# Patient Record
Sex: FEMALE | Race: WHITE | URBAN - METROPOLITAN AREA
[De-identification: names, ages, dates, MRNs, and addresses within clinical notes are randomized per-mention and may not be internally consistent; named-entity substitution may affect disease eponyms.]

---

## 2017-02-23 ENCOUNTER — TRANSFERRED RECORDS (OUTPATIENT)
Dept: HEALTH INFORMATION MANAGEMENT | Facility: CLINIC | Age: 37
End: 2017-02-23

## 2017-10-22 ENCOUNTER — HEALTH MAINTENANCE LETTER (OUTPATIENT)
Age: 37
End: 2017-10-22

## 2017-12-04 ENCOUNTER — TRANSFERRED RECORDS (OUTPATIENT)
Dept: HEALTH INFORMATION MANAGEMENT | Facility: CLINIC | Age: 37
End: 2017-12-04

## 2018-02-16 ENCOUNTER — OFFICE VISIT (OUTPATIENT)
Dept: OPHTHALMOLOGY | Facility: CLINIC | Age: 38
End: 2018-02-16
Attending: OPHTHALMOLOGY
Payer: COMMERCIAL

## 2018-02-16 DIAGNOSIS — H18.549 LATTICE CORNEAL DYSTROPHY: Primary | ICD-10-CM

## 2018-02-16 DIAGNOSIS — Z94.7 CORNEA REPLACED BY TRANSPLANT: ICD-10-CM

## 2018-02-16 PROCEDURE — G0463 HOSPITAL OUTPT CLINIC VISIT: HCPCS | Mod: ZF

## 2018-02-16 PROCEDURE — 92025 CPTRIZED CORNEAL TOPOGRAPHY: CPT | Mod: ZF | Performed by: OPHTHALMOLOGY

## 2018-02-16 ASSESSMENT — EXTERNAL EXAM - RIGHT EYE: OD_EXAM: NORMAL

## 2018-02-16 ASSESSMENT — SLIT LAMP EXAM - LIDS
COMMENTS: NORMAL
COMMENTS: NORMAL

## 2018-02-16 ASSESSMENT — EXTERNAL EXAM - LEFT EYE: OS_EXAM: NORMAL

## 2018-02-16 ASSESSMENT — CONF VISUAL FIELD
OS_NORMAL: 1
METHOD: COUNTING FINGERS
OD_NORMAL: 1

## 2018-02-16 ASSESSMENT — VISUAL ACUITY
OS_SC: 20/20
METHOD: SNELLEN - LINEAR
OD_SC+: -1
OD_SC: 20/80
CORRECTION_TYPE: CONTACTS
OD_PH_SC: 20/40-1
OS_SC+: -1

## 2018-02-16 ASSESSMENT — TONOMETRY
OS_IOP_MMHG: 22
OD_IOP_MMHG: 27
IOP_METHOD: ICARE

## 2018-02-16 ASSESSMENT — CUP TO DISC RATIO
OS_RATIO: 0.3
OD_RATIO: 0.3?

## 2018-02-16 NOTE — PROGRESS NOTES
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 Farris - 2010    H/o Herpes simples virus keratitis left eye    S/p punctal occlusion to all four tear ducts    Eye Meds:  - lotemax twice a day left eye     IMAGING & TESTING:    None    ASSESSMENT & PLAN:    1. History of penetrating keratoplasty (PK), left eye: for lattice corneal dystrophy, graft is doing well, extensive ocular surface dryness   - 4 punctal plugs in place - has had to replace several times (most recently Nov / Dec)   - recommend artificial tears 4x per day both eyes   - following with general ophthalmologist in [place], [place]    2. Lattice corneal dystrophy, both eyes: vision in right eye further decreased   - ready for transplant   - proceed with deep anterior lamellar keratoplasty (DALK) vs penetrating keratoplasty (PK) right eye     3. Elevated intraocular pressure - may be artifactual w corneal changes   - continue to monitor w Dr. Renner   - nerves look stable by exam   - reasonable to do OCT nerve fiber layer this year for tracking    R/B/A of surgery discussed  120 minutes for deep anterior lamellar keratoplasty (DALK)  General anesthesia        ~~~~~~~~~~~~~~~~~~~~~~~~~~~~~~~~~~~~~~~~~~~~~~~~~~~~~~~~~~~~~~~~    Complete documentation of historical and exam elements from today's encounter can be found in the full encounter summary report (not reduplicated in this progress note). I personally obtained the chief complaint(s) and history of present illness.  I confirmed and edited as necessary the review of systems, past medical/surgical history,  family history, social history, and examination findings as documented by others.  I examined the patient myself, and I personally reviewed the relevant tests, images, and reports as documented above. I formulated and edited as necessary the assessment and plan and discussed the findings and management plan with the patient and family.     I personally interpreted the diagnostic / imaging study and have edited the interpretation as needed.    Robbie Garcia MD, MA  Director, Cornea & Anterior Segment  [facility] Department of Ophthalmology & Visual Neuroscience

## 2018-02-16 NOTE — NURSING NOTE
Chief Complaints and History of Present Illnesses   Patient presents with     Follow Up For     Lattice corneal dystrophy - Both Eyes      HPI    Last Eye Exam:  3/22/16   Affected eye(s):  Both   Symptoms:        Unknown duration    Frequency:  Constant       Do you have eye pain now?:  No      Comments:  Leeanne is here today for a follow up of Lattice corneal dystrophy - Both Eyes   She states no change in vision since last visit. Since last visit she says she was diagnosed with the herpes virus LE. When she gets a flare up her vision becomes blurry LESanket Nettles COT 8:43 AM February 16, 2018

## 2018-02-16 NOTE — MR AVS SNAPSHOT
After Visit Summary   2/16/2018    Leeanne Butcher    MRN: [id number]           Patient Information     Date Of Birth          1980        Visit Information        Provider Department      2/16/2018 8:30 AM Robbie Garcia MD [facility]        Today's Diagnoses     Lattice corneal dystrophy - Both Eyes    -  1    Cornea replaced by transplant - Left Eye           Follow-ups after your visit        Your next 10 appointments already scheduled     Apr 24, 2018  8:15 AM CDT   Post-Op with Robbie Garcia MD   Eye Clinic ([facility])    [address] [address]   [phone]            May 01, 2018  8:30 AM CDT   Post-Op with Robbie Garcia MD   Eye Clinic ([facility])    [address] [address] [phone]            May 25, 2018  7:30 AM CDT   Post-Op with Robbie Garcia MD   Eye Clinic ([facility])    [address] [address]   [phone]              Who to contact     Please call your clinic at [phone] to:    Ask questions about your health    Make or cancel appointments    Discuss your medicines    Learn about your test results    Speak to your doctor            Additional Information About Your Visit        [employer] Information     [employer] gives you secure access to your electronic health record. If you see a primary care provider, you can also send messages to your care team and make appointments. If you have questions, please call your primary care clinic.  If you do not have a primary care provider, please call [phone] and they will assist you.      [employer] is an electronic gateway that provides easy, online access to your medical records. With [employer], you can request a clinic appointment, read your test results, renew a prescription or  communicate with your care team.     To access your existing account, please contact your [facility] or call [phone] for assistance.        Care EveryWhere ID     This is your Care EveryWhere ID. This could be used by other organizations to access your [place] medical records  [id number]         Blood Pressure from Last 3 Encounters:   No data found for BP    Weight from Last 3 Encounters:   No data found for Wt              We Performed the Following     Corneal Topography OU (both eyes)     Karen-Operative Worksheet        Primary Care Provider Fax #    Physician No Ref-Primary [phone]       No address on file        Equal Access to Services     [facility]: Hadii aad ku hadasho Soomaali, waaxda luqadaha, qaybta kaalmada adeshannayajaxson, [place] hernandez . So [facility] [phone].    ATENCIÓN: Si habla español, tiene a ferro disposición servicios gratuitos de asistencia lingüística. [facility] [phone].    We comply with applicable federal civil rights laws and [place] laws. We do not discriminate on the basis of race, color, national origin, age, disability, sex, sexual orientation, or gender identity.            Thank you!     Thank you for choosing EYE CLINIC  for your care. Our goal is always to provide you with excellent care. Hearing back from our patients is one way we can continue to improve our services. Please take a few minutes to complete the written survey that you may receive in the mail after your visit with us. Thank you!             Your Updated Medication List - Protect others around you: Learn how to safely use, store and throw away your medicines at www.disposemymeds.org.          This list is accurate as of 2/16/18 10:43 AM.  Always use your most recent med list.                   Brand Name Dispense Instructions for use Diagnosis    LOTEMAX 0.5 % ophthalmic susp   Generic drug:  loteprednol      Place 1 drop Into the left eye 2  times daily        ORTHO TRI-CYCLEN (28) PO      Take 1 tablet by mouth daily        UNABLE TO FIND      MEDICATION NAME:  Colestipol 1 GM, 2 tablets daily at bedtime.        VALTREX PO      Take 1 capsule by mouth daily

## 2018-04-20 RX ORDER — MONTELUKAST SODIUM 4 MG/1
1 TABLET, CHEWABLE ORAL 2 TIMES DAILY
COMMUNITY

## 2018-04-23 ENCOUNTER — SURGERY (OUTPATIENT)
Age: 38
End: 2018-04-23

## 2018-04-23 ENCOUNTER — HOSPITAL ENCOUNTER (OUTPATIENT)
Facility: CLINIC | Age: 38
Discharge: HOME | End: 2018-04-23
Attending: OPHTHALMOLOGY | Admitting: OPHTHALMOLOGY
Payer: COMMERCIAL

## 2018-04-23 ENCOUNTER — ANESTHESIA EVENT (OUTPATIENT)
Dept: SURGERY | Facility: CLINIC | Age: 38
End: 2018-04-23
Payer: COMMERCIAL

## 2018-04-23 ENCOUNTER — ANESTHESIA (OUTPATIENT)
Dept: SURGERY | Facility: CLINIC | Age: 38
End: 2018-04-23
Payer: COMMERCIAL

## 2018-04-23 VITALS
BODY MASS INDEX: 28.64 KG/M2 | HEIGHT: 68 IN | SYSTOLIC BLOOD PRESSURE: 101 MMHG | DIASTOLIC BLOOD PRESSURE: 68 MMHG | RESPIRATION RATE: 14 BRPM | OXYGEN SATURATION: 95 % | WEIGHT: 189 LBS | TEMPERATURE: 96.5 F

## 2018-04-23 DIAGNOSIS — H18.549 LATTICE CORNEAL DYSTROPHY: Primary | ICD-10-CM

## 2018-04-23 LAB
BACTERIA SPEC CULT: NORMAL
BACTERIA SPEC CULT: NORMAL
HCG UR QL: NEGATIVE
SPECIMEN SOURCE: NORMAL

## 2018-04-23 PROCEDURE — 71000028 ZZH EYE RECOVERY PHASE 2 EACH 15 MINS: Performed by: OPHTHALMOLOGY

## 2018-04-23 PROCEDURE — 25000125 ZZHC RX 250: Performed by: OPHTHALMOLOGY

## 2018-04-23 PROCEDURE — 25000128 H RX IP 250 OP 636: Performed by: NURSE ANESTHETIST, CERTIFIED REGISTERED

## 2018-04-23 PROCEDURE — 25000128 H RX IP 250 OP 636: Performed by: OPHTHALMOLOGY

## 2018-04-23 PROCEDURE — 25000128 H RX IP 250 OP 636: Performed by: ANESTHESIOLOGY

## 2018-04-23 PROCEDURE — 25000125 ZZHC RX 250

## 2018-04-23 PROCEDURE — 87075 CULTR BACTERIA EXCEPT BLOOD: CPT | Performed by: OPHTHALMOLOGY

## 2018-04-23 PROCEDURE — 36000104 ZZH EYE SURGERY LEVEL 4 1ST 30 MIN: Performed by: OPHTHALMOLOGY

## 2018-04-23 PROCEDURE — 37000009 ZZH ANESTHESIA TECHNICAL FEE, EACH ADDTL 15 MIN: Performed by: OPHTHALMOLOGY

## 2018-04-23 PROCEDURE — 71000005 ZZH RECOVERY EYE PHASE 1 LEVEL 1 EA ADDTL HR: Performed by: OPHTHALMOLOGY

## 2018-04-23 PROCEDURE — 81025 URINE PREGNANCY TEST: CPT | Performed by: ANESTHESIOLOGY

## 2018-04-23 PROCEDURE — 25000125 ZZHC RX 250: Performed by: NURSE ANESTHETIST, CERTIFIED REGISTERED

## 2018-04-23 PROCEDURE — 87070 CULTURE OTHR SPECIMN AEROBIC: CPT | Performed by: OPHTHALMOLOGY

## 2018-04-23 PROCEDURE — 71000004 ZZH RECOVERY EYE PHASE 1 LEVEL 1 FIRST HR: Performed by: OPHTHALMOLOGY

## 2018-04-23 PROCEDURE — 36000105 ZZH EYE SURGERY LEVEL 4 EA 15 ADDTL MIN: Performed by: OPHTHALMOLOGY

## 2018-04-23 PROCEDURE — 40000170 ZZH STATISTIC PRE-PROCEDURE ASSESSMENT II: Performed by: OPHTHALMOLOGY

## 2018-04-23 PROCEDURE — V2785 CORNEAL TISSUE PROCESSING: HCPCS | Performed by: OPHTHALMOLOGY

## 2018-04-23 PROCEDURE — 25000125 ZZHC RX 250: Performed by: ANESTHESIOLOGY

## 2018-04-23 PROCEDURE — 37000008 ZZH ANESTHESIA TECHNICAL FEE, 1ST 30 MIN: Performed by: OPHTHALMOLOGY

## 2018-04-23 PROCEDURE — 25000566 ZZH SEVOFLURANE, EA 15 MIN: Performed by: OPHTHALMOLOGY

## 2018-04-23 PROCEDURE — 27210794 ZZH OR GENERAL SUPPLY STERILE: Performed by: OPHTHALMOLOGY

## 2018-04-23 PROCEDURE — 87102 FUNGUS ISOLATION CULTURE: CPT | Performed by: OPHTHALMOLOGY

## 2018-04-23 DEVICE — EYE CORNEA PROCESS FEE FOR MN LIONS BANK: Type: IMPLANTABLE DEVICE | Site: EYE | Status: FUNCTIONAL

## 2018-04-23 RX ORDER — MEPERIDINE HYDROCHLORIDE 25 MG/ML
12.5 INJECTION INTRAMUSCULAR; INTRAVENOUS; SUBCUTANEOUS
Status: DISCONTINUED | OUTPATIENT
Start: 2018-04-23 | End: 2018-04-23 | Stop reason: HOSPADM

## 2018-04-23 RX ORDER — FENTANYL CITRATE 50 UG/ML
25-50 INJECTION, SOLUTION INTRAMUSCULAR; INTRAVENOUS
Status: DISCONTINUED | OUTPATIENT
Start: 2018-04-23 | End: 2018-04-23 | Stop reason: HOSPADM

## 2018-04-23 RX ORDER — ONDANSETRON 4 MG/1
4 TABLET, ORALLY DISINTEGRATING ORAL EVERY 30 MIN PRN
Status: DISCONTINUED | OUTPATIENT
Start: 2018-04-23 | End: 2018-04-23 | Stop reason: HOSPADM

## 2018-04-23 RX ORDER — PILOCARPINE HYDROCHLORIDE 10 MG/ML
1 SOLUTION/ DROPS OPHTHALMIC
Status: COMPLETED | OUTPATIENT
Start: 2018-04-23 | End: 2018-04-23

## 2018-04-23 RX ORDER — SODIUM CHLORIDE, SODIUM LACTATE, POTASSIUM CHLORIDE, CALCIUM CHLORIDE 600; 310; 30; 20 MG/100ML; MG/100ML; MG/100ML; MG/100ML
INJECTION, SOLUTION INTRAVENOUS CONTINUOUS
Status: DISCONTINUED | OUTPATIENT
Start: 2018-04-23 | End: 2018-04-23 | Stop reason: HOSPADM

## 2018-04-23 RX ORDER — LIDOCAINE HYDROCHLORIDE 20 MG/ML
INJECTION, SOLUTION INFILTRATION; PERINEURAL PRN
Status: DISCONTINUED | OUTPATIENT
Start: 2018-04-23 | End: 2018-04-23

## 2018-04-23 RX ORDER — ONDANSETRON 2 MG/ML
INJECTION INTRAMUSCULAR; INTRAVENOUS PRN
Status: DISCONTINUED | OUTPATIENT
Start: 2018-04-23 | End: 2018-04-23

## 2018-04-23 RX ORDER — HYDROMORPHONE HYDROCHLORIDE 1 MG/ML
.3-.5 INJECTION, SOLUTION INTRAMUSCULAR; INTRAVENOUS; SUBCUTANEOUS EVERY 10 MIN PRN
Status: DISCONTINUED | OUTPATIENT
Start: 2018-04-23 | End: 2018-04-23 | Stop reason: HOSPADM

## 2018-04-23 RX ORDER — PROPOFOL 10 MG/ML
INJECTION, EMULSION INTRAVENOUS PRN
Status: DISCONTINUED | OUTPATIENT
Start: 2018-04-23 | End: 2018-04-23

## 2018-04-23 RX ORDER — BALANCED SALT SOLUTION 6.4; .75; .48; .3; 3.9; 1.7 MG/ML; MG/ML; MG/ML; MG/ML; MG/ML; MG/ML
SOLUTION OPHTHALMIC PRN
Status: DISCONTINUED | OUTPATIENT
Start: 2018-04-23 | End: 2018-04-23 | Stop reason: HOSPADM

## 2018-04-23 RX ORDER — PREDNISOLONE ACETATE 10 MG/ML
1 SUSPENSION/ DROPS OPHTHALMIC 4 TIMES DAILY
Qty: 1 BOTTLE | Refills: 0 | Status: SHIPPED | OUTPATIENT
Start: 2018-04-23 | End: 2018-05-03

## 2018-04-23 RX ORDER — DEXAMETHASONE SODIUM PHOSPHATE 4 MG/ML
INJECTION, SOLUTION INTRA-ARTICULAR; INTRALESIONAL; INTRAMUSCULAR; INTRAVENOUS; SOFT TISSUE PRN
Status: DISCONTINUED | OUTPATIENT
Start: 2018-04-23 | End: 2018-04-23

## 2018-04-23 RX ORDER — FENTANYL CITRATE 50 UG/ML
INJECTION, SOLUTION INTRAMUSCULAR; INTRAVENOUS PRN
Status: DISCONTINUED | OUTPATIENT
Start: 2018-04-23 | End: 2018-04-23

## 2018-04-23 RX ORDER — OFLOXACIN 3 MG/ML
1 SOLUTION/ DROPS OPHTHALMIC 4 TIMES DAILY
Qty: 1 BOTTLE | Refills: 0 | Status: SHIPPED | OUTPATIENT
Start: 2018-04-23

## 2018-04-23 RX ORDER — PROPOFOL 10 MG/ML
INJECTION, EMULSION INTRAVENOUS CONTINUOUS PRN
Status: DISCONTINUED | OUTPATIENT
Start: 2018-04-23 | End: 2018-04-23

## 2018-04-23 RX ORDER — NALOXONE HYDROCHLORIDE 0.4 MG/ML
.1-.4 INJECTION, SOLUTION INTRAMUSCULAR; INTRAVENOUS; SUBCUTANEOUS
Status: DISCONTINUED | OUTPATIENT
Start: 2018-04-23 | End: 2018-04-23 | Stop reason: HOSPADM

## 2018-04-23 RX ORDER — ONDANSETRON 2 MG/ML
4 INJECTION INTRAMUSCULAR; INTRAVENOUS EVERY 30 MIN PRN
Status: DISCONTINUED | OUTPATIENT
Start: 2018-04-23 | End: 2018-04-23 | Stop reason: HOSPADM

## 2018-04-23 RX ORDER — ERYTHROMYCIN 5 MG/G
OINTMENT OPHTHALMIC PRN
Status: DISCONTINUED | OUTPATIENT
Start: 2018-04-23 | End: 2018-04-23 | Stop reason: HOSPADM

## 2018-04-23 RX ADMIN — SODIUM CHLORIDE, POTASSIUM CHLORIDE, SODIUM LACTATE AND CALCIUM CHLORIDE: 600; 310; 30; 20 INJECTION, SOLUTION INTRAVENOUS at 11:13

## 2018-04-23 RX ADMIN — PROPOFOL 75 MCG/KG/MIN: 10 INJECTION, EMULSION INTRAVENOUS at 11:53

## 2018-04-23 RX ADMIN — PHENYLEPHRINE HYDROCHLORIDE 100 MCG: 10 INJECTION, SOLUTION INTRAMUSCULAR; INTRAVENOUS; SUBCUTANEOUS at 12:58

## 2018-04-23 RX ADMIN — PHENYLEPHRINE HYDROCHLORIDE 50 MCG: 10 INJECTION, SOLUTION INTRAMUSCULAR; INTRAVENOUS; SUBCUTANEOUS at 12:19

## 2018-04-23 RX ADMIN — PROPOFOL 40 MG: 10 INJECTION, EMULSION INTRAVENOUS at 13:10

## 2018-04-23 RX ADMIN — DEXAMETHASONE SODIUM PHOSPHATE 4 MG: 4 INJECTION, SOLUTION INTRA-ARTICULAR; INTRALESIONAL; INTRAMUSCULAR; INTRAVENOUS; SOFT TISSUE at 11:41

## 2018-04-23 RX ADMIN — ONDANSETRON 4 MG: 2 INJECTION INTRAMUSCULAR; INTRAVENOUS at 13:38

## 2018-04-23 RX ADMIN — DEXMEDETOMIDINE HYDROCHLORIDE 8 MCG: 100 INJECTION, SOLUTION INTRAVENOUS at 14:07

## 2018-04-23 RX ADMIN — FENTANYL CITRATE 50 MCG: 50 INJECTION, SOLUTION INTRAMUSCULAR; INTRAVENOUS at 11:35

## 2018-04-23 RX ADMIN — PROPOFOL 60 MG: 10 INJECTION, EMULSION INTRAVENOUS at 11:42

## 2018-04-23 RX ADMIN — ERYTHROMYCIN 1 G: 5 OINTMENT OPHTHALMIC at 14:09

## 2018-04-23 RX ADMIN — PHENYLEPHRINE HYDROCHLORIDE 100 MCG: 10 INJECTION, SOLUTION INTRAMUSCULAR; INTRAVENOUS; SUBCUTANEOUS at 13:28

## 2018-04-23 RX ADMIN — MIDAZOLAM 2 MG: 1 INJECTION INTRAMUSCULAR; INTRAVENOUS at 11:25

## 2018-04-23 RX ADMIN — LIDOCAINE HYDROCHLORIDE 1 ML: 10 INJECTION, SOLUTION EPIDURAL; INFILTRATION; INTRACAUDAL; PERINEURAL at 11:13

## 2018-04-23 RX ADMIN — Medication 5.5 MG: at 12:05

## 2018-04-23 RX ADMIN — PILOCARPINE HYDROCHLORIDE 1 DROP: 10 SOLUTION/ DROPS OPHTHALMIC at 11:12

## 2018-04-23 RX ADMIN — LIDOCAINE HYDROCHLORIDE 100 MG: 20 INJECTION, SOLUTION INFILTRATION; PERINEURAL at 11:35

## 2018-04-23 RX ADMIN — DEXMEDETOMIDINE HYDROCHLORIDE 4 MCG: 100 INJECTION, SOLUTION INTRAVENOUS at 14:09

## 2018-04-23 RX ADMIN — PHENYLEPHRINE HYDROCHLORIDE 50 MCG: 10 INJECTION, SOLUTION INTRAMUSCULAR; INTRAVENOUS; SUBCUTANEOUS at 12:31

## 2018-04-23 RX ADMIN — TRYPAN BLUE 0.5 ML: 0.3 INJECTION, SOLUTION INTRAOCULAR; OPHTHALMIC at 12:51

## 2018-04-23 RX ADMIN — PHENYLEPHRINE HYDROCHLORIDE 100 MCG: 10 INJECTION, SOLUTION INTRAMUSCULAR; INTRAVENOUS; SUBCUTANEOUS at 13:45

## 2018-04-23 RX ADMIN — PILOCARPINE HYDROCHLORIDE 1 DROP: 10 SOLUTION/ DROPS OPHTHALMIC at 11:08

## 2018-04-23 RX ADMIN — PHENYLEPHRINE HYDROCHLORIDE 100 MCG: 10 INJECTION, SOLUTION INTRAMUSCULAR; INTRAVENOUS; SUBCUTANEOUS at 12:42

## 2018-04-23 RX ADMIN — LIDOCAINE HYDROCHLORIDE 3 ML: 20 INJECTION, SOLUTION EPIDURAL; INFILTRATION; INTRACAUDAL; PERINEURAL at 13:59

## 2018-04-23 RX ADMIN — DEXMEDETOMIDINE HYDROCHLORIDE 8 MCG: 100 INJECTION, SOLUTION INTRAVENOUS at 11:48

## 2018-04-23 RX ADMIN — PILOCARPINE HYDROCHLORIDE 1 DROP: 10 SOLUTION/ DROPS OPHTHALMIC at 10:59

## 2018-04-23 RX ADMIN — FENTANYL CITRATE 50 MCG: 50 INJECTION, SOLUTION INTRAMUSCULAR; INTRAVENOUS at 11:42

## 2018-04-23 RX ADMIN — BALANCED SALT SOLUTION 15 ML: 6.4; .75; .48; .3; 3.9; 1.7 SOLUTION OPHTHALMIC at 12:05

## 2018-04-23 RX ADMIN — DEXAMETHASONE SODIUM PHOSPHATE 1 ML GIVEN: 10 INJECTION, SOLUTION INTRAMUSCULAR; INTRAVENOUS at 13:59

## 2018-04-23 RX ADMIN — DEXMEDETOMIDINE HYDROCHLORIDE 12 MCG: 100 INJECTION, SOLUTION INTRAVENOUS at 13:56

## 2018-04-23 RX ADMIN — BALANCED SALT SOLUTION 15 ML: 6.4; .75; .48; .3; 3.9; 1.7 SOLUTION OPHTHALMIC at 12:52

## 2018-04-23 RX ADMIN — PROPOFOL 200 MG: 10 INJECTION, EMULSION INTRAVENOUS at 11:35

## 2018-04-23 RX ADMIN — SODIUM CHLORIDE, POTASSIUM CHLORIDE, SODIUM LACTATE AND CALCIUM CHLORIDE: 600; 310; 30; 20 INJECTION, SOLUTION INTRAVENOUS at 11:08

## 2018-04-23 ASSESSMENT — ENCOUNTER SYMPTOMS: SEIZURES: 0

## 2018-04-23 NOTE — ANESTHESIA POSTPROCEDURE EVALUATION
Patient: Leeanne Butcher    Procedure(s):  RIGHT EYE DEEP ANTERIOR LAMELLAR KERATOPLASTY ; BILATERAL UPPER AND LOWER EYE LID PUNCTAL CAUTERY - Wound Class: I-Clean    Diagnosis:lattice corneal dystrophy  Diagnosis Additional Information: No value filed.    Anesthesia Type:  General, LMA    Note:  Anesthesia Post Evaluation    Patient location during evaluation: PACU  Patient participation: Able to fully participate in evaluation  Level of consciousness: awake  Pain management: adequate  Airway patency: patent  Cardiovascular status: acceptable  Respiratory status: acceptable  Hydration status: acceptable  PONV: none     Anesthetic complications: None          Last vitals:  Vitals:    04/23/18 1515 04/23/18 1530 04/23/18 1540   BP: 104/63 106/65 101/68   Resp: 14 17 14   Temp:      SpO2: 94% 95% 95%         Electronically Signed By: Robbie Staton MD  April 23, 2018  5:09 PM

## 2018-04-23 NOTE — DISCHARGE INSTRUCTIONS
[facility]est[facility] Discharge  Instructions  Anesthesia (Eye Care Center)   Adult Discharge Instructions (General)    For 24 hours after surgery    1. Get plenty of rest.  Make arrangements to have a responsible adult stay with you for at least 24 hours.  2. Do not drive or use heavy equipment for 24 hours.    3. Do not drink alcohol for 24 hours.  4. Do not sign legal documents or make important decisions for 24 hours.  5. Avoid strenuous or risky activities. You may feel lightheaded.  If so, sit for a few minutes before standing.  Have someone help you get up.   6. General anesthesia patients should drink only clear liquids (apple juice, ginger ale, broth or 7-Up). Be sure to drink enough fluids.  Move to a regular diet as you feel able.  7. Any questions of medical nature, call your physician.    Dr. Garcia   [facility]  Corneal surgery post op instructions        Keep the eye shield over the operated eye tonight and every night for 1 week.    You do not have to start taking eye drops today. Keep the patch on the eye until your follow-up appointment tomorrow.    No heavy lifting, no bending down at the waist, no water in the eye.    Take tylenol as directed on the bottle if you have pain.    Please call [phone] and wait for the prompts to reach the on-call doctor if you develop severe pain, decreased vision, redness, or severe sensitivity to light.    Bring your eye drops to your appointment tomorrow.    You have a follow-up appointment with your doctor tomorrow at the [facility] clinic.

## 2018-04-23 NOTE — ANESTHESIA CARE TRANSFER NOTE
Patient: Leeanne Butcher    Procedure(s):  RIGHT EYE DEEP ANTERIOR LAMELLAR KERATOPLASTY ; BILATERAL UPPER AND LOWER EYE LID PUNCTAL CAUTERY - Wound Class: I-Clean    Diagnosis: lattice corneal dystrophy  Diagnosis Additional Information: No value filed.    Anesthesia Type:   General, LMA     Note:  Airway :Face Mask  Patient transferred to:PACU  Comments: Pt exhibits spontaneous respirations, follows commands, suctioned, LMA removed, exchanging well, transferred to pacu with O2 @ 10L via mask, all monitors and alarms on, report to RN, VSS.Handoff Report: Identifed the Patient, Identified the Reponsible Provider, Reviewed the pertinent medical history, Discussed the surgical course, Reviewed Intra-OP anesthesia mangement and issues during anesthesia, Set expectations for post-procedure period and Allowed opportunity for questions and acknowledgement of understanding      Vitals: (Last set prior to Anesthesia Care Transfer)    CRNA VITALS  4/23/2018 1351 - 4/23/2018 1428      4/23/2018             Pulse: 106    SpO2: 95 %    Resp Rate (set): 10                Electronically Signed By: MICHEAL Hughes CRNA  April 23, 2018  2:28 PM

## 2018-04-23 NOTE — ANESTHESIA PREPROCEDURE EVALUATION
Procedure: Procedure(s):  KERATOPLASTY DEEP ANTERIOR LAMELLAR  KERATOPLASTY PENETRATING  Preop diagnosis: lattice corneal dystrophy  No Known Allergies  There is no problem list on file for this patient.    Past Medical History:   Diagnosis Date     Lattice corneal dystrophy      Past Surgical History:   Procedure Laterality Date     C ANESTH,CORNEAL TRANSPLANT       EYE SURGERY Left        No current facility-administered medications on file prior to encounter.   Current Outpatient Prescriptions on File Prior to Encounter:  loteprednol (LOTEMAX) 0.5 % ophthalmic suspension Place 1 drop Into the left eye 2 times daily   Norgestim-Eth Estrad Triphasic (ORTHO TRI-CYCLEN, 28, PO) Take 1 tablet by mouth daily   UNABLE TO FIND MEDICATION NAME: Colestipol 1 GM, 2 tablets daily at bedtime.   ValACYclovir HCl (VALTREX PO) Take 1 capsule by mouth daily     There were no vitals taken for this visit.    No results found for: WBC  No results found for: RBC  Lab Results   Component Value Date    HGB 12.6 04/12/2010       Anesthesia Evaluation     . Pt has had prior anesthetic.     No history of anesthetic complications          ROS/MED HX    ENT/Pulmonary:  - neg pulmonary ROS   (+), recent URI . .   (-) sleep apnea   Neurologic:  - neg neurologic ROS    (-) seizures, Neuropathy and migraines   Cardiovascular:  - neg cardiovascular ROS       METS/Exercise Tolerance:     Hematologic:         Musculoskeletal:         GI/Hepatic:  - neg GI/hepatic ROS      (-) GERD   Renal/Genitourinary:  - ROS Renal section negative       Endo:  - neg endo ROS    (-) Type II DM and thyroid disease   Psychiatric:         Infectious Disease:         Malignancy:         Other: Comment: Lattice corneal dystrophy                    Physical Exam  Normal systems: cardiovascular, pulmonary and dental    Airway   Mallampati: II  TM distance: >3 FB  Neck ROM: full    Dental     Cardiovascular   Rhythm and rate: regular and normal      Pulmonary    breath  sounds clear to auscultation                    Anesthesia Plan      History & Physical Review  History and physical reviewed and following examination; no interval change.    ASA Status:  2 .    NPO Status:  > 8 hours    Plan for General and LMA   PONV prophylaxis:  Ondansetron (or other 5HT-3) and Dexamethasone or Solumedrol       Postoperative Care  Postoperative pain management:  IV analgesics.      Consents  Anesthetic plan, risks, benefits and alternatives discussed with:  Patient..                          .

## 2018-04-23 NOTE — IP AVS SNAPSHOT
MRN:[id number]                      After Visit Summary   4/23/2018    Leeanne Butcher    MRN: [id number]           Thank you!     Thank you for choosing [facility] for your care. Our goal is always to provide you with excellent care. Hearing back from our patients is one way we can continue to improve our services. Please take a few minutes to complete the written survey that you may receive in the mail after you visit with us. Thank you!        Patient Information     Date Of Birth          1980        About your hospital stay     You were admitted on:  April 23, 2018 You last received care in the:  [facility]    You were discharged on:  April 23, 2018       Who to Call     For medical emergencies, please call 911.  For non-urgent questions about your medical care, please call your primary care provider or clinic, None  For questions related to your surgery, please call your surgery clinic        Attending Provider     Provider Specialty    Robbie Garcia MD Ophthalmology       Primary Care Provider Fax #    Physician No Ref-Primary [phone]      Your next 10 appointments already scheduled     Apr 24, 2018  8:15 AM CDT   Post-Op with Robbie Garcia MD   Eye Clinic ([facility])    [place] [address] [facility] [address] [place] [address]   [phone]            May 01, 2018  8:30 AM CDT   Post-Op with Robbie Garcia MD   Eye Clinic ([facility])    [address]  [facility] [address]   [phone]            May 22, 2018  8:15 AM CDT   Post-Op with Robbie Garcia MD   Eye Clinic ([facility])    [address]  [facility] [address]   [phone]              Further instructions from your care team       [facility] Discharge  Instructions  Anesthesia  (Eye Care Center)   Adult Discharge Instructions (General)    For 24 hours after surgery    1. Get plenty of rest.  Make arrangements to have a responsible adult stay with you for at least 24 hours.  2. Do not drive or use heavy equipment for 24 hours.    3. Do not drink alcohol for 24 hours.  4. Do not sign legal documents or make important decisions for 24 hours.  5. Avoid strenuous or risky activities. You may feel lightheaded.  If so, sit for a few minutes before standing.  Have someone help you get up.   6. General anesthesia patients should drink only clear liquids (apple juice, ginger ale, broth or 7-Up). Be sure to drink enough fluids.  Move to a regular diet as you feel able.  7. Any questions of medical nature, call your physician.    Dr. Garcia   [facility]  Corneal surgery post op instructions        Keep the eye shield over the operated eye tonight and every night for 1 week.    You do not have to start taking eye drops today. Keep the patch on the eye until your follow-up appointment tomorrow.    No heavy lifting, no bending down at the waist, no water in the eye.    Take tylenol as directed on the bottle if you have pain.    Please call [phone] and wait for the prompts to reach the on-call doctor if you develop severe pain, decreased vision, redness, or severe sensitivity to light.    Bring your eye drops to your appointment tomorrow.    You have a follow-up appointment with your doctor tomorrow at the [facility] clinic.        Pending Results     Date and Time Order Name Status Description    4/23/2018 1309 Tissue Culture Aerobic Bacterial In process     4/23/2018 1309 Fungus Culture, non-blood In process     4/23/2018 1309 Anaerobic bacterial culture In process             Admission Information     Date & Time Provider Department Dept. Phone    4/23/2018 Robbie Garcia MD [facility] [phone]      Your Vitals Were     Blood Pressure  "Temperature Respirations Height Weight Last Period    101/68 96.5  F (35.8  C) (Temporal) 14 1.727 m (5' 8\") 85.7 kg (189 lb) 04/08/2018 (Exact Date)    Pulse Oximetry BMI (Body Mass Index)                95% 28.74 kg/m2          [employer] Information     [employer] gives you secure access to your electronic health record. If you see a primary care provider, you can also send messages to your care team and make appointments. If you have questions, please call your primary care clinic.  If you do not have a primary care provider, please call [phone] and they will assist you.        Care EveryWhere ID     This is your Care EveryWhere ID. This could be used by other organizations to access your [place] medical records  [id number]        Equal Access to Services     ANAY MENDEZ : Whitney Healy, cleve neil, calderon sen, angeles lan. So [facility] [phone].    ATENCIÓN: Si habla español, tiene a ferro disposición servicios gratuitos de asistencia lingüística. Llame al [phone].    We comply with applicable federal civil rights laws and [place] laws. We do not discriminate on the basis of race, color, national origin, age, disability, sex, sexual orientation, or gender identity.               Review of your medicines      UNREVIEWED medicines. Ask your doctor about these medicines        Dose / Directions    colestipol 1 g tablet   Commonly known as:  COLESTID        Dose:  1 g   Take 1 g by mouth 2 times daily Take 2 tablets   Refills:  0       LOTEMAX 0.5 % ophthalmic susp   Generic drug:  loteprednol        Dose:  1 drop   Place 1 drop Into the left eye 2 times daily   Refills:  0       ORTHO TRI-CYCLEN (28) PO        Dose:  1 tablet   Take 1 tablet by mouth daily   Refills:  0       UNABLE TO FIND        MEDICATION NAME:  Colestipol 1 GM, 2 tablets daily at bedtime.   Refills:  0       VALTREX PO        Dose:  1 capsule   Take 1 capsule by mouth " daily   Refills:  0         START taking        Dose / Directions    ofloxacin 0.3 % ophthalmic solution   Commonly known as:  OCUFLOX   Used for:  Lattice corneal dystrophy        Dose:  1 drop   Place 1 drop into both eyes 4 times daily   Quantity:  1 Bottle   Refills:  0       prednisoLONE acetate 1 % ophthalmic susp   Commonly known as:  PRED FORTE   Used for:  Lattice corneal dystrophy        Dose:  1 drop   Place 1 drop into both eyes 4 times daily   Quantity:  1 Bottle   Refills:  0            Where to get your medicines      These medications were sent to [facility] [place] - [address]  [address], [place] [address]     Phone:  [phone]     ofloxacin 0.3 % ophthalmic solution    prednisoLONE acetate 1 % ophthalmic susp                Protect others around you: Learn how to safely use, store and throw away your medicines at www.disposemymeds.org.             Medication List: This is a list of all your medications and when to take them. Check marks below indicate your daily home schedule. Keep this list as a reference.      Medications           Morning Afternoon Evening Bedtime As Needed    colestipol 1 g tablet   Commonly known as:  COLESTID   Take 1 g by mouth 2 times daily Take 2 tablets                                LOTEMAX 0.5 % ophthalmic susp   Place 1 drop Into the left eye 2 times daily   Generic drug:  loteprednol                                ofloxacin 0.3 % ophthalmic solution   Commonly known as:  OCUFLOX   Place 1 drop into both eyes 4 times daily                                ORTHO TRI-CYCLEN (28) PO   Take 1 tablet by mouth daily                                prednisoLONE acetate 1 % ophthalmic susp   Commonly known as:  PRED FORTE   Place 1 drop into both eyes 4 times daily                                UNABLE TO FIND   MEDICATION NAME:  Colestipol 1 GM, 2 tablets daily at bedtime.   Last time this was given:  1 ml given on 4/23/2018  1:59 PM                                 VALTREX PO   Take 1 capsule by mouth daily

## 2018-04-23 NOTE — IP AVS SNAPSHOT
[facility]    [address]    [place] [address]    Phone:  [phone]    Fax:  [phone]                                       After Visit Summary   4/23/2018    Leeanne Butcher    MRN: [id number]           After Visit Summary Signature Page     I have received my discharge instructions, and my questions have been answered. I have discussed any challenges I see with this plan with the nurse or doctor.    ..........................................................................................................................................  Patient/Patient Representative Signature      ..........................................................................................................................................  Patient Representative Print Name and Relationship to Patient    ..................................................               ................................................  Date                                            Time    ..........................................................................................................................................  Reviewed by Signature/Title    ...................................................              ..............................................  Date                                                            Time

## 2018-04-23 NOTE — OP NOTE
Date of Procedure: 4/23/2018    Attending: Robbie Garcia MD    Fellow: Sonny Ortiz DO    Preoperative Diagnosis: lattice corneal dystrophy, right eye; Dry eyes, both eyes    Postoperative Diagnosis: same    Procedure: deep anterior lamellar keratoplasty, right eye; punctal cautery bilateral upper and lower lids      INDICATION FOR PROCEDURE  The patient has a history of lattice corneal dystrophy resulting in decreased visual acuity not amenable to correction  with spectacles or contact lens. The risks including, but not limited to, infection, loss of vision,  loss of eye, need for more surgery, retinal detachment and bleeding, along with the benefits,  alternatives, expectations, and the procedure itself were discussed at length with the patient,  who agreed to proceed with surgery.    DESCRIPTION OF PROCEDURE  In the preop area, the patient was identified, the surgical site marked, and informed consent was  obtained.  The patient was then brought back to the operating room where the appropriate anesthesia  monitors were connected and general was induced. The eye was prepped and draped in the  usual sterile fashion for ophthalmic surgery. The operating microscope was moved into position.  The cornea was measured and an 8.0 mm Robles trephine was selected for the patient and and  8.25 mm Robles punch was selected for the donor cornea. The microscope lights were turned off.  The central cornea was marked with a marking pen. The trephine blade was set at zero and 3  quarter turns were made to retract the blade. The trephine was placed over the marked center  of the cornea and suction was applied. Once good suction was obtained, 8 quarter turns were  made to partially trephinate the cornea.  A 27g needle was inserted at the base of the trephination and advanced to the center of the  cornea. Air was injected into the corneal stroma and emphysema was noted to propagate from  limbus to limbus, and the anterior chamber air  bubbles were noted to migrate peripherally.  The stromal cap was excised with a crescent blade. The remaining stroma was marked with a  marking pen and a dab of viscoelastic applied to the stromal surface. A super sharp blade was  used to incise the remaining corneal stroma and decompress the air-filled space between  stroma and Descemet's membrane.  This space was then filled with viscoelastic, and the remaining stromal tissue was carefully  excised with the DALK scissors. The bared Descemet's membrane was then rinsed of any  viscoelastic and dried peripherally, with any remaining corneal stroma excised away with  Vannas scissors.  Attention was then directed to the donor tissue. The donor tissue was centered on the platform  and suction was applied. A Weck shelly sponge was used to denude the endothelial surface.  Trypan blue was irrigated onto the endothelial surface. The Descemet's membrane was then  scored peripherally and stripped off of the donor. The donor tissue was then trephinated with an 8.25 millimeters donor punch. The  donor corneoscleral rim was sent to microbiology for culture and sensitivities.  The stripped donor cornea was removed from the platform using a Cook spatula and placed  endothelial side down on the host Descemet's membrane . The 4 cardinal sutures were placed  using 10-0 nylon on a CS-160 spatulated needle. The remaining sutures were placed, taking  care not to perforate Descemet's membrane. The suture ends were cut and rotated into the  peripheral bed.    Subconjunctival injections of ancef and dexamethasone and lidocaine were injected into the inferior fornix.     The drapes were then removed and a retrobulbar block was placed in the right eye.      Intradermal lidocaine was then injected around the puncta in all four lids.  Existing silicone plugs were removed, and high temp cautery was used to cauterize all four puncta.      Erythromycin ointment was placed on each eye. A patch and  shield were taped over the right eye. The patient was then taken to  the recovery room in stable condition having tolerated the procedure well and discharged home  in good condition.    Dr. Robbie Garcia was present and scrubbed for the entire case.

## 2018-04-24 ENCOUNTER — OFFICE VISIT (OUTPATIENT)
Dept: OPHTHALMOLOGY | Facility: CLINIC | Age: 38
End: 2018-04-24
Attending: OPHTHALMOLOGY
Payer: COMMERCIAL

## 2018-04-24 DIAGNOSIS — Z94.7 H/O CORNEA TRANSPLANT: Primary | ICD-10-CM

## 2018-04-24 DIAGNOSIS — T85.398A MECHANICAL COMPLICATION DUE TO CORNEAL GRAFT, INITIAL ENCOUNTER: ICD-10-CM

## 2018-04-24 PROCEDURE — 66020 INJECTION TREATMENT OF EYE: CPT | Mod: RT | Performed by: OPHTHALMOLOGY

## 2018-04-24 PROCEDURE — G0463 HOSPITAL OUTPT CLINIC VISIT: HCPCS | Mod: 25

## 2018-04-24 PROCEDURE — 25000132 ZZH RX MED GY IP 250 OP 250 PS 637: Mod: ZF | Performed by: OPHTHALMOLOGY

## 2018-04-24 PROCEDURE — 92132 CPTRZD OPH DX IMG ANT SGM: CPT | Mod: ZF | Performed by: OPHTHALMOLOGY

## 2018-04-24 RX ORDER — ACETAZOLAMIDE 500 MG/1
500 CAPSULE, EXTENDED RELEASE ORAL 2 TIMES DAILY
Qty: 20 CAPSULE | Refills: 1 | Status: SHIPPED | OUTPATIENT
Start: 2018-04-24

## 2018-04-24 RX ORDER — ATROPINE SULFATE 10 MG/ML
1 SOLUTION/ DROPS OPHTHALMIC 2 TIMES DAILY
Qty: 1 BOTTLE | Refills: 3 | Status: SHIPPED | OUTPATIENT
Start: 2018-04-24

## 2018-04-24 RX ORDER — VALACYCLOVIR HYDROCHLORIDE 500 MG/1
500 TABLET, FILM COATED ORAL DAILY
Qty: 14 TABLET | Refills: 1 | Status: SHIPPED | OUTPATIENT
Start: 2018-04-24

## 2018-04-24 RX ORDER — ACETAZOLAMIDE 250 MG/1
250 TABLET ORAL ONCE
Status: COMPLETED | OUTPATIENT
Start: 2018-04-24 | End: 2018-04-24

## 2018-04-24 RX ADMIN — ACETAZOLAMIDE 250 MG: 250 TABLET ORAL at 12:24

## 2018-04-24 ASSESSMENT — TONOMETRY
OD_IOP_MMHG: 40
IOP_METHOD: TONOPEN
IOP_METHOD: ICARE
OD_IOP_MMHG: 35
OD_IOP_MMHG: X
OS_IOP_MMHG: 16
IOP_METHOD: TONOPEN

## 2018-04-24 ASSESSMENT — VISUAL ACUITY
OD_SC: 20/300
METHOD: SNELLEN - LINEAR
OS_SC: 20/30
OS_SC+: -1+2

## 2018-04-24 NOTE — NURSING NOTE
Chief Complaints and History of Present Illnesses   Patient presents with     Post Op (Ophthalmology) Both Eyes     POD#1 s/p deep anterior lamellar keratoplasty, right eye; punctal cautery bilateral upper and lower lids     HPI    Affected eye(s):  Both   Symptoms:     Tearing   Itching   No burning   Eye discharge         Do you have eye pain now?:  No      Comments:    Patient notes her LE had some discharge which she notes isn't unusual for her, just an increased amount.  Patient notes she is using all the drops in the LE as directed, hasn't had patch off the RE    Michelle Stake April 24, 2018 8:21 AM

## 2018-04-24 NOTE — PATIENT INSTRUCTIONS
Right Eye:    Prednisolone four times a day    Ofloxacin four times a day   Atropine twice daily  Diamox (acetazolamide) twice a day     Left Eye:    Lotemax twice daily

## 2018-04-24 NOTE — MR AVS SNAPSHOT
After Visit Summary   4/24/2018    Leeanne Butcher    MRN: [id number]           Patient Information     Date Of Birth          1980        Visit Information        Provider Department      4/24/2018 8:15 AM Robbie Garcia MD [facility]        Today's Diagnoses     H/O cornea transplant    -  1       Follow-ups after your visit        Your next 10 appointments already scheduled     Apr 27, 2018  8:00 AM CDT   Post-Op with Robbie Garcia MD   Eye Clinic ([facility])    [address] [address]   [phone]            May 01, 2018  8:30 AM CDT   Post-Op with Robbie Garcia MD   Eye Clinic ([facility])    [address] [address] [phone]            May 22, 2018  8:15 AM CDT   Post-Op with Robbie Garcia MD   Eye Clinic ([facility])    [address] [address] [phone]              Who to contact     Please call your clinic at [phone] to:    Ask questions about your health    Make or cancel appointments    Discuss your medicines    Learn about your test results    Speak to your doctor            Additional Information About Your Visit        coRankt Information     [employer] gives you secure access to your electronic health record. If you see a primary care provider, you can also send messages to your care team and make appointments. If you have questions, please call your primary care clinic.  If you do not have a primary care provider, please call [phone] and they will assist you.      [employer] is an electronic gateway that provides easy, online access to your medical records. With [employer], you can request a clinic appointment, read your test results, renew a prescription or communicate with your care team.     To access your existing  account, please contact your [facility] or call [phone] for assistance.        Care EveryWhere ID     This is your Care EveryWhere ID. This could be used by other organizations to access your [place] medical records  [id number]        Your Vitals Were     Last Period                   04/08/2018 (Exact Date)            Blood Pressure from Last 3 Encounters:   04/23/18 101/68    Weight from Last 3 Encounters:   04/23/18 85.7 kg (189 lb)              We Performed the Following     OCT Anterior Segment Spectralis OD (right eye)          Today's Medication Changes          These changes are accurate as of 4/24/18 11:49 AM.  If you have any questions, ask your nurse or doctor.               Start taking these medicines.        Dose/Directions    acetaZOLAMIDE 500 MG 12 hr capsule   Commonly known as:  DIAMOX SEQUEL   Used for:  H/O cornea transplant   Started by:  Robbie Garcia MD        Dose:  500 mg   Take 1 capsule (500 mg) by mouth 2 times daily   Quantity:  20 capsule   Refills:  1            Where to get your medicines      These medications were sent to [facility] [address] [place] [address] [address] [address]    Hours:  TRANSPLANT PHONE NUMBER [phone] Phone:  [phone]     acetaZOLAMIDE 500 MG 12 hr capsule                Primary Care Provider Fax #    Physician No Ref-Primary [phone]       No address on file        Equal Access to Services     ANAY MENDEZ AH: Hadeliceo duvallo Somalik, waaxda luqadaha, qaybta kaalmada francy, ang[place] lan. So [facility] [phone].    ATENCIÓN: Si habla español, tiene a ferro disposición servicios gratuitos de asistencia lingüística. Llame al [phone].    We comply with applicable federal civil rights laws and [place] laws. We do not discriminate on the basis of race, color, national origin, age, disability, sex,  sexual orientation, or gender identity.            Thank you!     Thank you for choosing [facility] CLINIC  for your care. Our goal is always to provide you with excellent care. Hearing back from our patients is one way we can continue to improve our services. Please take a few minutes to complete the written survey that you may receive in the mail after your visit with us. Thank you!             Your Updated Medication List - Protect others around you: Learn how to safely use, store and throw away your medicines at www.disposemymeds.org.          This list is accurate as of 4/24/18 11:49 AM.  Always use your most recent med list.                   Brand Name Dispense Instructions for use Diagnosis    acetaZOLAMIDE 500 MG 12 hr capsule    DIAMOX SEQUEL    20 capsule    Take 1 capsule (500 mg) by mouth 2 times daily    H/O cornea transplant       colestipol 1 g tablet    COLESTID     Take 1 g by mouth 2 times daily Take 2 tablets        LOTEMAX 0.5 % ophthalmic susp   Generic drug:  loteprednol      Place 1 drop Into the left eye 2 times daily        ofloxacin 0.3 % ophthalmic solution    OCUFLOX    1 Bottle    Place 1 drop into both eyes 4 times daily    Lattice corneal dystrophy       ORTHO TRI-CYCLEN (28) PO      Take 1 tablet by mouth daily        prednisoLONE acetate 1 % ophthalmic susp    PRED FORTE    1 Bottle    Place 1 drop into both eyes 4 times daily    Lattice corneal dystrophy       UNABLE TO FIND      MEDICATION NAME:  Colestipol 1 GM, 2 tablets daily at bedtime.        VALTREX PO      Take 1 capsule by mouth daily

## 2018-04-25 ENCOUNTER — TELEPHONE (OUTPATIENT)
Dept: OPHTHALMOLOGY | Facility: CLINIC | Age: 38
End: 2018-04-25

## 2018-04-25 NOTE — TELEPHONE ENCOUNTER
----- Message from Paul Burr RN sent at 4/25/2018 11:01 AM CDT -----  Regarding: FW: RX Question - Dr Page  Contact: [phone]  Please review  Pt instructions daily  Rx for 2/day  Paul Burr RN 11:02 AM 04/25/18  Thank you   ----- Message -----     From: Lorrie Aguilar     Sent: 4/25/2018  10:54 AM       To: [facility]  Subject: RX Question - Dr Page                            The pt's mom Aura is asking for clarification of the instructions for atropine 1 % ophthalmic solution. The directions on the bottle are different than the directions they got in the clinic.  Please call the pt at [phone] or the pt's mom Aura at [phone].    Thanks - Lorrie    Please DO NOT send this message and/or reply back to sender.  Call Center Representatives DO NOT respond to messages.        SWP this morning with her mother; verified that she is to be using the atropine twice a day one drop into the RE.    She just wanted to make sure as the patient instructions deflected differently from Dr Ortiz and the prescription ordered.    All is okay with patient today, and will continue the regimen of drops for surgery eye RE and will dothe lotemax twice daily LE.    I will go addendum the patient instructions from yesterdays visit to be correct.    Vera BALDERAS 11:10 AM April 25, 2018

## 2018-04-27 ENCOUNTER — OFFICE VISIT (OUTPATIENT)
Dept: OPHTHALMOLOGY | Facility: CLINIC | Age: 38
End: 2018-04-27
Attending: OPHTHALMOLOGY
Payer: COMMERCIAL

## 2018-04-27 DIAGNOSIS — Z94.7 CORNEA REPLACED BY TRANSPLANT: Primary | ICD-10-CM

## 2018-04-27 DIAGNOSIS — T85.398D MECHANICAL COMPLICATION DUE TO CORNEAL GRAFT, SUBSEQUENT ENCOUNTER: ICD-10-CM

## 2018-04-27 PROCEDURE — 92132 CPTRZD OPH DX IMG ANT SGM: CPT | Mod: ZF | Performed by: OPHTHALMOLOGY

## 2018-04-27 PROCEDURE — G0463 HOSPITAL OUTPT CLINIC VISIT: HCPCS | Mod: ZF

## 2018-04-27 ASSESSMENT — CONF VISUAL FIELD
OD_INFERIOR_NASAL_RESTRICTION: 1
OD_SUPERIOR_NASAL_RESTRICTION: 3
METHOD: COUNTING FINGERS
OS_NORMAL: 1

## 2018-04-27 ASSESSMENT — EXTERNAL EXAM - LEFT EYE: OS_EXAM: NORMAL

## 2018-04-27 ASSESSMENT — VISUAL ACUITY
METHOD: SNELLEN - LINEAR
OS_PH_SC: 20/25+2
OS_SC: 20/30
OD_SC: 3/200 E

## 2018-04-27 ASSESSMENT — EXTERNAL EXAM - RIGHT EYE: OD_EXAM: NORMAL

## 2018-04-27 ASSESSMENT — TONOMETRY
IOP_METHOD: ICARE
OS_IOP_MMHG: 16
OD_IOP_MMHG: 07

## 2018-04-27 ASSESSMENT — SLIT LAMP EXAM - LIDS
COMMENTS: NORMAL
COMMENTS: NORMAL

## 2018-04-27 NOTE — PROGRESS NOTES
Assessment / Plan:    Leeanne Butcher is a 37 year old female who is 3 day s/p deep anterior lamellar keratoplasty (DALK) right eye and 2 day s/p rebubble.    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     To OR for rebubble with more central paracentesis  Counseled re: R/B/A    Sonny Ortiz, DO  Cornea Fellow      ~~~~~~~~~~~~~~~~~~~~~~~~~~~~~~~~~~~~~~~~~~~~~~~~~~~~~~~~~~~~~~~~    Complete documentation of historical and exam elements from today's encounter can be found in the full encounter summary report (not reduplicated in this progress note). I personally obtained the chief complaint(s) and history of present illness.  I confirmed and edited as necessary the review of systems, past medical/surgical history, family history, social history, and examination findings as documented by others.  I examined the patient myself, and I personally reviewed the relevant tests, images, and reports as documented above. I formulated and edited as necessary the assessment and plan and discussed the findings and management plan with the patient and family.     Robbie Garcia MD, MA  Director, Cornea & Anterior Segment  [facility] Department of Ophthalmology & Visual Neuroscience

## 2018-04-27 NOTE — MR AVS SNAPSHOT
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, subsequent encounter           Follow-ups after your visit        Your next 10 appointments already scheduled     Apr 30, 2018   Procedure with Robbie Garcia MD   [facility] [place] [facility] (--)    [address]  [facility] [address]   [phone]            May 01, 2018  8:30 AM CDT   Post-Op with Robbie Garcia MD   Eye Clinic ([facility])    [address] [address] [phone]            May 22, 2018  8:15 AM CDT   Post-Op with Robbie Garcia MD   Eye Clinic ([facility])    [address] [address] [phone]              Who to contact     Please call your clinic at [phone] to:    Ask questions about your health    Make or cancel appointments    Discuss your medicines    Learn about your test results    Speak to your doctor            Additional Information About Your Visit        Triblio Information     [employer] gives you secure access to your electronic health record. If you see a primary care provider, you can also send messages to your care team and make appointments. If you have questions, please call your primary care clinic.  If you do not have a primary care provider, please call [phone] and they will assist you.      [employer] is an electronic gateway that provides easy, online access to your medical records. With [employer], you can request a clinic appointment, read your test results, renew a prescription or communicate with  your care team.     To access your existing account, please contact your [facility] or call [phone] for assistance.        Care EveryWhere ID     This is your Care EveryWhere ID. This could be used by other organizations to access your [place] medical records  [id number]        Your Vitals Were     Last Period                   04/08/2018 (Exact Date)            Blood Pressure from Last 3 Encounters:   04/23/18 101/68    Weight from Last 3 Encounters:   04/23/18 85.7 kg (189 lb)              We Performed the Following     OCT Anterior Segment Spectralis OD (right eye)     Karen-Operative Worksheet        Primary Care Provider Fax #    Physician No Ref-Primary [phone]       No address on file        Equal Access to Services     ANAY MENDEZ : Whitney Healy, cleve neil, calderon kaalmajaxson sen, [place] lan. So [facility] [phone].    ATENCIÓN: Si habla español, tiene a ferro disposición servicios gratuitos de asistencia lingüística. Llame al [phone].    We comply with applicable federal civil rights laws and [place] laws. We do not discriminate on the basis of race, color, national origin, age, disability, sex, sexual orientation, or gender identity.            Thank you!     Thank you for choosing [facility]YE CLINIC  for your care. Our goal is always to provide you with excellent care. Hearing back from our patients is one way we can continue to improve our services. Please take a few minutes to complete the written survey that you may receive in the mail after your visit with us. Thank you!             Your Updated Medication List - Protect others around you: Learn how to safely use, store and throw away your medicines at www.disposemymeds.org.          This list is accurate as of 4/27/18 10:20 AM.  Always use your most recent med list.                   Brand Name Dispense Instructions for use Diagnosis    acetaZOLAMIDE 500 MG  12 hr capsule    DIAMOX SEQUEL    20 capsule    Take 1 capsule (500 mg) by mouth 2 times daily    H/O cornea transplant       atropine 1 % ophthalmic solution     1 Bottle    Place 1 drop into the right eye 2 times daily    H/O cornea transplant       colestipol 1 g tablet    COLESTID     Take 1 g by mouth 2 times daily Take 2 tablets        LOTEMAX 0.5 % ophthalmic susp   Generic drug:  loteprednol      Place 1 drop Into the left eye 2 times daily        ofloxacin 0.3 % ophthalmic solution    OCUFLOX    1 Bottle    Place 1 drop into both eyes 4 times daily    Lattice corneal dystrophy       ORTHO TRI-CYCLEN (28) PO      Take 1 tablet by mouth daily        prednisoLONE acetate 1 % ophthalmic susp    PRED FORTE    1 Bottle    Place 1 drop into both eyes 4 times daily    Lattice corneal dystrophy       UNABLE TO FIND      MEDICATION NAME:  Colestipol 1 GM, 2 tablets daily at bedtime.        * VALTREX PO      Take 1 capsule by mouth daily        * valACYclovir 500 MG tablet    VALTREX    14 tablet    Take 1 tablet (500 mg) by mouth daily    H/O cornea transplant       * Notice:  This list has 2 medication(s) that are the same as other medications prescribed for you. Read the directions carefully, and ask your doctor or other care provider to review them with you.

## 2018-04-27 NOTE — NURSING NOTE
Chief Complaints and History of Present Illnesses   Patient presents with     Follow Up For     3 day follow up s/p DALK RE     HPI    Affected eye(s):  Right   Symptoms:     No floaters   No flashes   No Dryness   No itching         Do you have eye pain now?:  No      Comments:  Pt states vision in RE has improved slightly, but is still very blurry. Redness and irritation in RE today.    Neeta SADNHU April 27, 2018 7:53 AM

## 2018-04-30 ENCOUNTER — TRANSFERRED RECORDS (OUTPATIENT)
Dept: HEALTH INFORMATION MANAGEMENT | Facility: CLINIC | Age: 38
End: 2018-04-30

## 2018-04-30 LAB
BACTERIA SPEC CULT: NO GROWTH
BACTERIA SPEC CULT: NORMAL
Lab: NORMAL
SPECIMEN SOURCE: NORMAL
SPECIMEN SOURCE: NORMAL

## 2018-05-01 ENCOUNTER — OFFICE VISIT (OUTPATIENT)
Dept: OPHTHALMOLOGY | Facility: CLINIC | Age: 38
End: 2018-05-01
Attending: OPHTHALMOLOGY
Payer: COMMERCIAL

## 2018-05-01 DIAGNOSIS — Z94.7 CORNEA REPLACED BY TRANSPLANT: Primary | ICD-10-CM

## 2018-05-01 DIAGNOSIS — T85.398D MECHANICAL COMPLICATION DUE TO CORNEAL GRAFT, SUBSEQUENT ENCOUNTER: Primary | ICD-10-CM

## 2018-05-01 PROCEDURE — G0463 HOSPITAL OUTPT CLINIC VISIT: HCPCS | Mod: ZF

## 2018-05-01 PROCEDURE — 92132 CPTRZD OPH DX IMG ANT SGM: CPT | Mod: ZF | Performed by: OPHTHALMOLOGY

## 2018-05-01 ASSESSMENT — TONOMETRY
IOP_METHOD: ICARE
OD_IOP_MMHG: 5
OS_IOP_MMHG: CONT

## 2018-05-01 ASSESSMENT — CONF VISUAL FIELD
OD_INFERIOR_TEMPORAL_RESTRICTION: 1
METHOD: COUNTING FINGERS
OD_SUPERIOR_TEMPORAL_RESTRICTION: 1
OS_NORMAL: 1
OD_SUPERIOR_NASAL_RESTRICTION: 1
OD_INFERIOR_NASAL_RESTRICTION: 1

## 2018-05-01 ASSESSMENT — EXTERNAL EXAM - LEFT EYE: OS_EXAM: NORMAL

## 2018-05-01 ASSESSMENT — VISUAL ACUITY
OD_SC: HM
OS_CC+: -2
METHOD: SNELLEN - LINEAR
OS_CC: 20/30
CORRECTION_TYPE: CONTACTS
OS_PH_CC: 20/20-1

## 2018-05-01 ASSESSMENT — SLIT LAMP EXAM - LIDS
COMMENTS: NORMAL
COMMENTS: NORMAL

## 2018-05-01 ASSESSMENT — EXTERNAL EXAM - RIGHT EYE: OD_EXAM: NORMAL

## 2018-05-01 NOTE — PROGRESS NOTES
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 No pain    Medications in the surgical eye:    prednisolone acetate 1% four times a day      Ofloxacin four times a day      deep anterior lamellar keratoplasty (DALK) OD s/p rebubble x 2  - Odalys's layer attached  - 50% SF6 bubble  - sutures intact  - OCT cornea today - persistent Descemet's detachment, no break detected  - restart pred and oflox four times a day   - continue positioning until evening today, and then half a day for next 2 days  -  - precautions     F/u Monday    KIM Chowdary  Cornea fellow      ~~~~~~~~~~~~~~~~~~~~~~~~~~~~~~~~~~~~~~~~~~~~~~~~~~~~~~~~~~~~~~~~    Complete documentation of historical and exam elements from today's encounter can be found in the full encounter summary report (not reduplicated in this progress note). I personally obtained the chief complaint(s) and history of present illness.  I confirmed and edited as necessary the review of systems, past medical/surgical history, family history, social history, and examination findings as documented by others.  I examined the patient myself, and I personally reviewed the relevant tests, images, and reports as documented above. I formulated and edited as necessary the assessment and plan and discussed the findings and management plan with the patient and family.     I personally interpreted the diagnostic / imaging study and have edited the interpretation as needed.    Robbie Garcia MD, MA  Director, Cornea & Anterior Segment  [facility] Department of Ophthalmology & Visual Neuroscience

## 2018-05-01 NOTE — MR AVS SNAPSHOT
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, subsequent encounter    -  1       Follow-ups after your visit        Your next 10 appointments already scheduled     May 03, 2018  7:30 AM CDT   Post-Op with Robbie Garcia MD   Eye Clinic ([facility])    [address] [address] [phone]            May 22, 2018  8:15 AM CDT   Post-Op with Robbie Garcia MD   Eye Clinic ([facility])    [place] [address] [address] [phone]              Who to contact     Please call your clinic at [phone] to:    Ask questions about your health    Make or cancel appointments    Discuss your medicines    Learn about your test results    Speak to your doctor            Additional Information About Your Visit        MyChart Information     [employer] gives you secure access to your electronic health record. If you see a primary care provider, you can also send messages to your care team and make appointments. If you have questions, please call your primary care clinic.  If you do not have a primary care provider, please call [phone] and they will assist you.      [employer] is an electronic gateway that provides easy, online access to your medical records. With [employer], you can request a clinic appointment, read your test results, renew a prescription or communicate with your care team.     To access your existing account, please contact your [facility] or call [phone] for assistance.        Care EveryWhere ID     This is your Care EveryWhere ID. This could  be used by other organizations to access your [place] medical records  [id number]        Your Vitals Were     Last Period                   04/08/2018 (Exact Date)            Blood Pressure from Last 3 Encounters:   04/23/18 101/68    Weight from Last 3 Encounters:   04/23/18 85.7 kg (189 lb)              We Performed the Following     OCT Anterior Segment Spectralis OD (right eye)        Primary Care Provider Fax #    Physician No Ref-Primary [phone]       No address on file        Equal Access to Services     ANAY MENDEZ : Hadii aad ku hadasho Soomaali, waaxda luqadaha, qaybta kaalmada adeegyada, waxay idiin hayaan adeeg kharash la'ryanne . So [facility] [phone].    ATENCIÓN: Si habla español, tiene a ferro disposición servicios gratuitos de asistencia lingüística. Ll[facility] [place] [phone].    We comply with applicable federal civil rights laws and [place] laws. We do not discriminate on the basis of race, color, national origin, age, disability, sex, sexual orientation, or gender identity.            Thank you!     Thank you for choosing EYE CLINIC  for your care. Our goal is always to provide you with excellent care. Hearing back from our patients is one way we can continue to improve our services. Please take a few minutes to complete the written survey that you may receive in the mail after your visit with us. Thank you!             Your Updated Medication List - Protect others around you: Learn how to safely use, store and throw away your medicines at www.disposemymeds.org.          This list is accurate as of 5/1/18 10:55 AM.  Always use your most recent med list.                   Brand Name Dispense Instructions for use Diagnosis    acetaZOLAMIDE 500 MG 12 hr capsule    DIAMOX SEQUEL    20 capsule    Take 1 capsule (500 mg) by mouth 2 times daily    H/O cornea transplant       atropine 1 % ophthalmic solution     1 Bottle    Place 1 drop into the right eye 2 times daily    H/O cornea transplant        colestipol 1 g tablet    COLESTID     Take 1 g by mouth 2 times daily Take 2 tablets        LOTEMAX 0.5 % ophthalmic susp   Generic drug:  loteprednol      Place 1 drop Into the left eye 2 times daily        ofloxacin 0.3 % ophthalmic solution    OCUFLOX    1 Bottle    Place 1 drop into both eyes 4 times daily    Lattice corneal dystrophy       ORTHO TRI-CYCLEN (28) PO      Take 1 tablet by mouth daily        prednisoLONE acetate 1 % ophthalmic susp    PRED FORTE    1 Bottle    Place 1 drop into both eyes 4 times daily    Lattice corneal dystrophy       UNABLE TO FIND      MEDICATION NAME:  Colestipol 1 GM, 2 tablets daily at bedtime.        * VALTREX PO      Take 1 capsule by mouth daily        * valACYclovir 500 MG tablet    VALTREX    14 tablet    Take 1 tablet (500 mg) by mouth daily    H/O cornea transplant       * Notice:  This list has 2 medication(s) that are the same as other medications prescribed for you. Read the directions carefully, and ask your doctor or other care provider to review them with you.

## 2018-05-01 NOTE — NURSING NOTE
Chief Complaints and History of Present Illnesses   Patient presents with     Post Op (Ophthalmology) Right Eye       day1 rebubble      HPI    Last Eye Exam:  4/27/18   Affected eye(s):  Right   Symptoms:        Duration:  1 day   Frequency:  Constant       Do you have eye pain now?:  No      Comments:  Leeanne is here today 1 day post op after rebubble. She says she did not sleep well last night due to the the position she slept in.   She says her RE feels okay, just a little scratchy.  She says she sees some objects.     Jt Nettles COT 8:43 AM May 1, 2018

## 2018-05-03 ENCOUNTER — OFFICE VISIT (OUTPATIENT)
Dept: OPHTHALMOLOGY | Facility: CLINIC | Age: 38
End: 2018-05-03
Attending: OPHTHALMOLOGY
Payer: COMMERCIAL

## 2018-05-03 DIAGNOSIS — Z94.7 CORNEA REPLACED BY TRANSPLANT: ICD-10-CM

## 2018-05-03 DIAGNOSIS — H18.549 LATTICE CORNEAL DYSTROPHY: ICD-10-CM

## 2018-05-03 DIAGNOSIS — T85.398D MECHANICAL COMPLICATION DUE TO CORNEAL GRAFT, SUBSEQUENT ENCOUNTER: Primary | ICD-10-CM

## 2018-05-03 PROCEDURE — G0463 HOSPITAL OUTPT CLINIC VISIT: HCPCS | Mod: ZF

## 2018-05-03 PROCEDURE — 92132 CPTRZD OPH DX IMG ANT SGM: CPT | Mod: ZF | Performed by: OPHTHALMOLOGY

## 2018-05-03 RX ORDER — PREDNISOLONE ACETATE 10 MG/ML
1 SUSPENSION/ DROPS OPHTHALMIC 4 TIMES DAILY
Qty: 1 BOTTLE | Refills: 6 | Status: SHIPPED | OUTPATIENT
Start: 2018-05-03

## 2018-05-03 RX ORDER — SILICONE ADHESIVE 1.5" X 3"
1 SHEET (EA) TOPICAL 4 TIMES DAILY PRN
Qty: 1 BOTTLE | Refills: 5 | Status: SHIPPED | OUTPATIENT
Start: 2018-05-03

## 2018-05-03 ASSESSMENT — TONOMETRY
OD_IOP_MMHG: 07
IOP_METHOD: ICARE
OS_IOP_MMHG: RGP

## 2018-05-03 ASSESSMENT — VISUAL ACUITY
OS_CC: 20/20
CORRECTION_TYPE: CONTACTS
OD_SC: 20/500
OD_PH_SC: 20/250
METHOD: SNELLEN - LINEAR

## 2018-05-03 ASSESSMENT — EXTERNAL EXAM - LEFT EYE: OS_EXAM: NORMAL

## 2018-05-03 ASSESSMENT — EXTERNAL EXAM - RIGHT EYE: OD_EXAM: NORMAL

## 2018-05-03 ASSESSMENT — SLIT LAMP EXAM - LIDS
COMMENTS: NORMAL
COMMENTS: NORMAL

## 2018-05-03 NOTE — MR AVS SNAPSHOT
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        Lattice corneal dystrophy - Both Eyes        Lattice corneal dystrophy           Follow-ups after your visit        Your next 10 appointments already scheduled     May 15, 2018  1:30 PM CDT   RETURN CORNEA with Robbie Garcia MD   Eye Clinic ([facility])    [address] [address]   [phone]            May 17, 2018  8:30 AM CDT   Post-Op with Robbie Garcia MD   Eye Clinic ([facility])    [address] [address]   [phone]            May 22, 2018  8:15 AM CDT   Post-Op with Robbie Garcia MD   Eye Clinic ([facility])    [address] [address]   [phone]            Joaquim 15, 2018  7:30 AM CDT   Post-Op with Robbie Garcia MD   Eye Clinic ([facility])    [address] [address]   [phone]              Who to contact     Please call your clinic at [phone] to:    Ask questions about your health    Make or cancel appointments    Discuss your medicines    Learn about your test results    Speak to your doctor            Additional Information About Your Visit        MyChart Information     [employer] gives you secure access to your electronic health record. If you see a primary care provider, you can also send messages to your care team and make appointments. If you have questions, please  call your primary care clinic.  If you do not have a primary care provider, please call [phone] and they will assist you.      [employer] is an electronic gateway that provides easy, online access to your medical records. With [employer], you can request a clinic appointment, read your test results, renew a prescription or communicate with your care team.     To access your existing account, please contact your [facility] or call [phone] for assistance.        Care EveryWhere ID     This is your Care EveryWhere ID. This could be used by other organizations to access your [place] medical records  [id number]        Your Vitals Were     Last Period                   04/08/2018 (Exact Date)            Blood Pressure from Last 3 Encounters:   04/23/18 101/68    Weight from Last 3 Encounters:   04/23/18 85.7 kg (189 lb)              We Performed the Following     OCT Anterior Segment Spectralis OD (right eye)     Karen-Operative Worksheet          Today's Medication Changes          These changes are accurate as of 5/3/18  9:22 AM.  If you have any questions, ask your nurse or doctor.               Start taking these medicines.        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  [address] [place] [address]     Phone:  [phone]     prednisoLONE acetate 1 % ophthalmic susp    sodium chloride 5 % ophthalmic solution                Primary Care Provider Fax #    Physician No Ref-Primary [phone]       No address on file        Equal Access to Services      ANAY [facility]: Hadii aad ku swathi Healy, waaxda luqadaha, qaybta kaalmada aderuben, angeles wilder ironkendal del rio garcía david . So [facility] [phone].    ATENCIÓN: Si habla gilberto, tiene a ferro disposición servicios gratuitos de asistencia lingüística. Llame al [phone].    We comply with applicable federal civil rights laws and [place] laws. We do not discriminate on the basis of race, color, national origin, age, disability, sex, sexual orientation, or gender identity.            Thank you!     Thank you for choosing EYE CLINIC  for your care. Our goal is always to provide you with excellent care. Hearing back from our patients is one way we can continue to improve our services. Please take a few minutes to complete the written survey that you may receive in the mail after your visit with us. Thank you!             Your Updated Medication List - Protect others around you: Learn how to safely use, store and throw away your medicines at www.disposemymeds.org.          This list is accurate as of 5/3/18  9:22 AM.  Always use your most recent med list.                   Brand Name Dispense Instructions for use Diagnosis    acetaZOLAMIDE 500 MG 12 hr capsule    DIAMOX SEQUEL    20 capsule    Take 1 capsule (500 mg) by mouth 2 times daily    H/O cornea transplant       atropine 1 % ophthalmic solution     1 Bottle    Place 1 drop into the right eye 2 times daily    H/O cornea transplant       colestipol 1 g tablet    COLESTID     Take 1 g by mouth 2 times daily Take 2 tablets        LOTEMAX 0.5 % ophthalmic susp   Generic drug:  loteprednol      Place 1 drop Into the left eye 2 times daily        ofloxacin 0.3 % ophthalmic solution    OCUFLOX    1 Bottle    Place 1 drop into both eyes 4 times daily    Lattice corneal dystrophy       ORTHO TRI-CYCLEN (28) PO      Take 1 tablet by mouth daily        prednisoLONE acetate 1 % ophthalmic susp    PRED FORTE    1 Bottle    Place 1 drop into both eyes 4 times daily     Lattice corneal dystrophy       sodium chloride 5 % ophthalmic solution    TRANG 128    1 Bottle    Place 1 drop into the right eye 4 times daily as needed    Cornea replaced by transplant, 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        * VALTREX PO      Take 1 capsule by mouth daily        * valACYclovir 500 MG tablet    VALTREX    14 tablet    Take 1 tablet (500 mg) by mouth daily    H/O cornea transplant       * Notice:  This list has 2 medication(s) that are the same as other medications prescribed for you. Read the directions carefully, and ask your doctor or other care provider to review them with you.

## 2018-05-03 NOTE — PROGRESS NOTES
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 No pain, eye more red this morning.    Medications in the surgical eye:    prednisolone acetate 1% four times a day      Ofloxacin four times a day      deep anterior lamellar keratoplasty (DALK) OD s/p rebubble x 2  - descemet detached on exam and OCT, separate dung's layer identified on OCT cornea today  - 30% SF6 bubble  - sutures intact, no signs of infection  - continue pred and oflox four times a day   - discussed options of observation vs rebubble vs PKP      KIM Chowdary  Cornea fellow      ~~~~~~~~~~~~~~~~~~~~~~~~~~~~~~~~~~~~~~~~~~~~~~~~~~~~~~~~~~~~~~~~    Complete documentation of historical and exam elements from today's encounter can be found in the full encounter summary report (not reduplicated in this progress note). I personally obtained the chief complaint(s) and history of present illness.  I confirmed and edited as necessary the review of systems, past medical/surgical history, family history, social history, and examination findings as documented by others.  I examined the patient myself, and I personally reviewed the relevant tests, images, and reports as documented above. I formulated and edited as necessary the assessment and plan and discussed the findings and management plan with the patient and family.     I personally interpreted the diagnostic / imaging study and have edited the interpretation as needed.    Robbie Garcia MD, MA  Director, Cornea & Anterior Segment  [facility] Department of Ophthalmology & Visual Neuroscience

## 2018-05-03 NOTE — NURSING NOTE
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 States that she woke up this morning and RE was very red.  No pain BE.  VA is still really blurry but has improved RE.  Brionna Ivy COT 7:23 AM May 3, 2018

## 2018-05-07 NOTE — PROGRESS NOTES
1 day s/p deep anterior lamellar keratoplasty (DALK) right eye for Lattice dystrophy  Previous penetrating keratoplasty (PK) left eye    K edema  Ant seg spectralis shows splitting of D.M. And Odalys's layer, both detached    Counseled re findings    Recommend air injection at slit lamp    Air injection performed inferiorly where descemet's detachment least peripheral  Betadine placed / lido gel / lid speculum    Air injected on 32g needle at inferior limbus  Air noted to enter interstitial space between posterior lamellae with high intraocular pressure (40s)    Atropine, cosopt, and diamox given   Intraocular pressure improved and pain improved    Continue diamox and atropine    Return to clinic Friday for reassessment, may need return to OR for rebubble      ~~~~~~~~~~~~~~~~~~~~~~~~~~~~~~~~~~~~~~~~~~~~~~~~~~~~~~~~~~~~~~~~    Complete documentation of historical and exam elements from today's encounter can be found in the full encounter summary report (not reduplicated in this progress note). I personally obtained the chief complaint(s) and history of present illness.  I confirmed and edited as necessary the review of systems, past medical/surgical history, family history, social history, and examination findings as documented by others.  I examined the patient myself, and I personally reviewed the relevant tests, images, and reports as documented above. I formulated and edited as necessary the assessment and plan and discussed the findings and management plan with the patient and family.     I personally interpreted the diagnostic / imaging study and have edited the interpretation as needed.    Robbie Garcia MD, MA  Director, Cornea & Anterior Segment  [facility] Department of Ophthalmology & Visual Neuroscience

## 2018-05-15 ENCOUNTER — OFFICE VISIT (OUTPATIENT)
Dept: OPHTHALMOLOGY | Facility: CLINIC | Age: 38
End: 2018-05-15
Attending: OPHTHALMOLOGY
Payer: COMMERCIAL

## 2018-05-15 DIAGNOSIS — Z94.7 CORNEA REPLACED BY TRANSPLANT: Primary | ICD-10-CM

## 2018-05-15 DIAGNOSIS — T85.398D MECHANICAL COMPLICATION DUE TO CORNEAL GRAFT, SUBSEQUENT ENCOUNTER: ICD-10-CM

## 2018-05-15 PROCEDURE — G0463 HOSPITAL OUTPT CLINIC VISIT: HCPCS | Mod: ZF

## 2018-05-15 ASSESSMENT — VISUAL ACUITY
OS_PH_CC: 20/25
OD_SC: 20/400
METHOD: SNELLEN - LINEAR
OD_PH_SC: 20/150
OS_CC: 20/40
OS_CC+: +2

## 2018-05-15 ASSESSMENT — CONF VISUAL FIELD
OD_NORMAL: 1
OS_NORMAL: 1

## 2018-05-15 ASSESSMENT — TONOMETRY
OD_IOP_MMHG: 10
OS_IOP_MMHG: CTL
IOP_METHOD: ICARE

## 2018-05-15 ASSESSMENT — EXTERNAL EXAM - RIGHT EYE: OD_EXAM: NORMAL

## 2018-05-15 ASSESSMENT — SLIT LAMP EXAM - LIDS
COMMENTS: NORMAL
COMMENTS: NORMAL

## 2018-05-15 ASSESSMENT — EXTERNAL EXAM - LEFT EYE: OS_EXAM: NORMAL

## 2018-05-15 NOTE — NURSING NOTE
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  Pred forte 4 x daily RE  Marcy 4 X daily RE  Lotemax 2 X daily ALDA Corea  2:08 PM May 15, 2018

## 2018-05-15 NOTE — MR AVS SNAPSHOT
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, subsequent encounter           Follow-ups after your visit        Your next 10 appointments already scheduled     Joaquim 15, 2018  7:30 AM CDT   Post-Op with Robbie Garcia MD   Eye Clinic ([facility])    [address]  [facility] [address]   [phone]              Who to contact     Please call your clinic at [phone] to:    Ask questions about your health    Make or cancel appointments    Discuss your medicines    Learn about your test results    Speak to your doctor            Additional Information About Your Visit        MyChart Information     [employer] gives you secure access to your electronic health record. If you see a primary care provider, you can also send messages to your care team and make appointments. If you have questions, please call your primary care clinic.  If you do not have a primary care provider, please call [phone] and they will assist you.      [employer] is an electronic gateway that provides easy, online access to your medical records. With [employer], you can request a clinic appointment, read your test results, renew a prescription or communicate with your care team.     To access your existing account, please contact your [facility] or call [phone] for assistance.        Care EveryWhere ID     This is your Care EveryWhere ID. This could be used by other organizations to access your [place] medical records  [id number]         Blood Pressure from Last 3 Encounters:   04/23/18 101/68    Weight from  Last 3 Encounters:   04/23/18 85.7 kg (189 lb)              Today, you had the following     No orders found for display       Primary Care Provider Fax #    Physician No Ref-Primary [phone]       No address on file        Equal Access to Services     ANAY ANDREA : Whitney Healy, waaxda luqadaha, qaybta kaalmada aderuben, angeles lopezkendal lan. So [facility] [phone].    ATENCIÓN: Si habla español, tiene a ferro disposición servicios gratuitos de asistencia lingüística. Llame al [phone].    We comply with applicable federal civil rights laws and [place] laws. We do not discriminate on the basis of race, color, national origin, age, disability, sex, sexual orientation, or gender identity.            Thank you!     Thank you for choosing EYE CLINIC  for your care. Our goal is always to provide you with excellent care. Hearing back from our patients is one way we can continue to improve our services. Please take a few minutes to complete the written survey that you may receive in the mail after your visit with us. Thank you!             Your Updated Medication List - Protect others around you: Learn how to safely use, store and throw away your medicines at www.disposemymeds.org.          This list is accurate as of 5/15/18 11:59 PM.  Always use your most recent med list.                   Brand Name Dispense Instructions for use Diagnosis    acetaZOLAMIDE 500 MG 12 hr capsule    DIAMOX SEQUEL    20 capsule    Take 1 capsule (500 mg) by mouth 2 times daily    H/O cornea transplant       atropine 1 % ophthalmic solution     1 Bottle    Place 1 drop into the right eye 2 times daily    H/O cornea transplant       colestipol 1 g tablet    COLESTID     Take 1 g by mouth 2 times daily Take 2 tablets        LOTEMAX 0.5 % ophthalmic susp   Generic drug:  loteprednol      Place 1 drop Into the left eye 2 times daily        ofloxacin 0.3 % ophthalmic solution    OCUFLOX    1 Bottle     Place 1 drop into both eyes 4 times daily    Lattice corneal dystrophy       ORTHO TRI-CYCLEN (28) PO      Take 1 tablet by mouth daily        prednisoLONE acetate 1 % ophthalmic susp    PRED FORTE    1 Bottle    Place 1 drop into both eyes 4 times daily    Lattice corneal dystrophy       sodium chloride 5 % ophthalmic solution    TRANG 128    1 Bottle    Place 1 drop into the right eye 4 times daily as needed    Cornea replaced by transplant, 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        * VALTREX PO      Take 1 capsule by mouth daily        * valACYclovir 500 MG tablet    VALTREX    14 tablet    Take 1 tablet (500 mg) by mouth daily    H/O cornea transplant       * Notice:  This list has 2 medication(s) that are the same as other medications prescribed for you. Read the directions carefully, and ask your doctor or other care provider to review them with you.

## 2018-05-15 NOTE — PROGRESS NOTES
Assessment / Plan:    Leeanne Butcher is a 37 year old female who is     s/p deep anterior lamellar keratoplasty (DALK) right eye (4/23/18)  s/p rebubble in clinic (4/24/18): bubble was in interface  S/p rebubble with SF6 in OR (4/30/18)    Feeling comfortable.  Vision is still very blurry OD.    Medications in the surgical eye:    prednisolone acetate 1% four times a day      Marcy QID    Deep anterior lamellar keratoplasty (DALK) OD s/p rebubble x 2    - descemet detached on exam and OCT, separate dung's layer identified on OCT cornea   - sutures intact, no signs of infection  - continue pred QID   - plan for PK tomorrow with general    F/u post op    Sonny Ortiz, DO  Cornea Fellow      ~~~~~~~~~~~~~~~~~~~~~~~~~~~~~~~~~~~~~~~~~~~~~~~~~~~~~~~~~~~~~~~~    Complete documentation of historical and exam elements from today's encounter can be found in the full encounter summary report (not reduplicated in this progress note). I personally obtained the chief complaint(s) and history of present illness.  I confirmed and edited as necessary the review of systems, past medical/surgical history, family history, social history, and examination findings as documented by others.  I examined the patient myself, and I personally reviewed the relevant tests, images, and reports as documented above. I formulated and edited as necessary the assessment and plan and discussed the findings and management plan with the patient and family.     Robbie Garcia MD, MA  Director, Cornea & Anterior Segment  [facility] Department of Ophthalmology & Visual Neuroscience

## 2018-05-16 ENCOUNTER — TRANSFERRED RECORDS (OUTPATIENT)
Dept: HEALTH INFORMATION MANAGEMENT | Facility: CLINIC | Age: 38
End: 2018-05-16

## 2018-05-17 ENCOUNTER — OFFICE VISIT (OUTPATIENT)
Dept: OPHTHALMOLOGY | Facility: CLINIC | Age: 38
End: 2018-05-17
Attending: OPHTHALMOLOGY
Payer: COMMERCIAL

## 2018-05-17 DIAGNOSIS — Z94.7 CORNEA REPLACED BY TRANSPLANT: Primary | ICD-10-CM

## 2018-05-17 PROCEDURE — G0463 HOSPITAL OUTPT CLINIC VISIT: HCPCS | Mod: ZF

## 2018-05-17 ASSESSMENT — TONOMETRY
OD_IOP_MMHG: 09
OS_IOP_MMHG: 20
IOP_METHOD: ICARE

## 2018-05-17 ASSESSMENT — VISUAL ACUITY
OS_SC: 20/60
METHOD: SNELLEN - LINEAR
OD_SC: 20/300
OD_PH_SC: 20/125

## 2018-05-17 ASSESSMENT — EXTERNAL EXAM - LEFT EYE: OS_EXAM: NORMAL

## 2018-05-17 ASSESSMENT — SLIT LAMP EXAM - LIDS
COMMENTS: NORMAL
COMMENTS: NORMAL

## 2018-05-17 ASSESSMENT — EXTERNAL EXAM - RIGHT EYE: OD_EXAM: NORMAL

## 2018-05-17 NOTE — PROGRESS NOTES
Assessment / Plan:    Leeanne Butcher is a 37 year old female who is     s/p deep anterior lamellar keratoplasty (DALK) right eye (4/23/18)  s/p rebubble in clinic (4/24/18): bubble was in interface  S/p rebubble with SF6 in OR (4/30/18)  S/p pentrating keratoplasty (PKP) 5/16/18    Comfortable overnight.  Vision slightly improved.    Medications in the surgical eye:    prednisolone acetate 1% four times a day   Ofloxacin four times a day        DC Marcy QID    F/u 1 week earlier as needed    Wear shield at night, limit activity, call will decreased vision, increased pain or redness.    Sonny Ortiz, DO  Cornea Fellow      ~~~~~~~~~~~~~~~~~~~~~~~~~~~~~~~~~~~~~~~~~~~~~~~~~~~~~~~~~~~~~~~~    Complete documentation of historical and exam elements from today's encounter can be found in the full encounter summary report (not reduplicated in this progress note). I personally obtained the chief complaint(s) and history of present illness.  I confirmed and edited as necessary the review of systems, past medical/surgical history, family history, social history, and examination findings as documented by others.  I examined the patient myself, and I personally reviewed the relevant tests, images, and reports as documented above. I formulated and edited as necessary the assessment and plan and discussed the findings and management plan with the patient and family.     Robbie Garcia MD, MA  Director, Cornea & Anterior Segment  [facility] Department of Ophthalmology & Visual Neuroscience

## 2018-05-17 NOTE — NURSING NOTE
Chief Complaints and History of Present Illnesses   Patient presents with     Post Op (Ophthalmology) Right Eye     1 day post op PK RE     HPI    Affected eye(s):  Right   Symptoms:           Do you have eye pain now?:  No      Comments:  1 day post op PK RE  Slept good till 3 AM.had OK night  Valerie BALDERAS 8:35 AM May 17, 2018

## 2018-05-17 NOTE — MR AVS SNAPSHOT
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      Your next 10 appointments already scheduled     Joaquim 15, 2018  7:30 AM CDT   Post-Op with Robbie Garcia MD   Eye Clinic ([facility])    [address]  [facility] [id number] [phone]              Who to contact     Please call your clinic at [phone] to:    Ask questions about your health    Make or cancel appointments    Discuss your medicines    Learn about your test results    Speak to your doctor            Additional Information About Your Visit        MyChart Information     NICE gives you secure access to your electronic health record. If you see a primary care provider, you can also send messages to your care team and make appointments. If you have questions, please call your primary care clinic.  If you do not have a primary care provider, please call [phone] and they will assist you.      NICE is an electronic gateway that provides easy, online access to your medical records. With NICE, you can request a clinic appointment, read your test results, renew a prescription or communicate with your care team.     To access your existing account, please contact your [facility] or call [phone] for assistance.        Care EveryWhere ID     This is your Care EveryWhere ID. This could be used by other organizations to access your [place] medical records  [id number]         Blood Pressure from Last 3 Encounters:   04/23/18 101/68     Weight from Last 3 Encounters:   04/23/18 85.7 kg (189 lb)              Today, you had the following     No orders found for display       Primary Care Provider Fax #    Physician No Ref-Primary [phone]       No address on file        Equal Access to Services     ANAY ANDREA : Whitney Healy, wavickyda luqadaha, qaybta kaalmada aderuben, [place] lopezkendal edyta. So [facility] [phone].    ATENCIÓN: Si habla español, tiene a ferro disposición servicios gratuitos de asistencia lingüística. Llame al [phone].    We comply with applicable federal civil rights laws and [place] laws. We do not discriminate on the basis of race, color, national origin, age, disability, sex, sexual orientation, or gender identity.            Thank you!     Thank you for choosing EYE CLINIC  for your care. Our goal is always to provide you with excellent care. Hearing back from our patients is one way we can continue to improve our services. Please take a few minutes to complete the written survey that you may receive in the mail after your visit with us. Thank you!             Your Updated Medication List - Protect others around you: Learn how to safely use, store and throw away your medicines at www.disposemymeds.org.          This list is accurate as of 5/17/18 11:59 PM.  Always use your most recent med list.                   Brand Name Dispense Instructions for use Diagnosis    acetaZOLAMIDE 500 MG 12 hr capsule    DIAMOX SEQUEL    20 capsule    Take 1 capsule (500 mg) by mouth 2 times daily    H/O cornea transplant       atropine 1 % ophthalmic solution     1 Bottle    Place 1 drop into the right eye 2 times daily    H/O cornea transplant       colestipol 1 g tablet    COLESTID     Take 1 g by mouth 2 times daily Take 2 tablets        LOTEMAX 0.5 % ophthalmic susp   Generic drug:  loteprednol      Place 1 drop Into the left eye 2 times daily        ofloxacin 0.3 % ophthalmic solution    OCUFLOX     1 Bottle    Place 1 drop into both eyes 4 times daily    Lattice corneal dystrophy       ORTHO TRI-CYCLEN (28) PO      Take 1 tablet by mouth daily        prednisoLONE acetate 1 % ophthalmic susp    PRED FORTE    1 Bottle    Place 1 drop into both eyes 4 times daily    Lattice corneal dystrophy       sodium chloride 5 % ophthalmic solution    TRANG 128    1 Bottle    Place 1 drop into the right eye 4 times daily as needed    Cornea replaced by transplant, 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        * VALTREX PO      Take 1 capsule by mouth daily        * valACYclovir 500 MG tablet    VALTREX    14 tablet    Take 1 tablet (500 mg) by mouth daily    H/O cornea transplant       * Notice:  This list has 2 medication(s) that are the same as other medications prescribed for you. Read the directions carefully, and ask your doctor or other care provider to review them with you.

## 2018-05-21 ENCOUNTER — TELEPHONE (OUTPATIENT)
Dept: OPHTHALMOLOGY | Facility: CLINIC | Age: 38
End: 2018-05-21

## 2018-05-21 LAB
FUNGUS SPEC CULT: NORMAL
SPECIMEN SOURCE: NORMAL

## 2018-05-21 NOTE — TELEPHONE ENCOUNTER
[facility] Call Center    Phone Message    May a detailed message be left on voicemail: yes    Reason for Call: Other: Emely from Micro-Biology lab at [facility] called stating pt has tested positive for eye culture relating to cornea. Emely said this requires immediate action and is requesting a doctor or nurse call to discuss.     Action Taken: Message routed to:  [facility] ([facility]): Ophthamology

## 2018-05-22 ENCOUNTER — OFFICE VISIT (OUTPATIENT)
Dept: OPHTHALMOLOGY | Facility: CLINIC | Age: 38
End: 2018-05-22
Attending: OPHTHALMOLOGY
Payer: COMMERCIAL

## 2018-05-22 DIAGNOSIS — Z94.7 CORNEA REPLACED BY TRANSPLANT: Primary | ICD-10-CM

## 2018-05-22 PROCEDURE — G0463 HOSPITAL OUTPT CLINIC VISIT: HCPCS | Mod: ZF

## 2018-05-22 RX ORDER — OFLOXACIN 3 MG/ML
1 SOLUTION/ DROPS OPHTHALMIC 4 TIMES DAILY
COMMUNITY

## 2018-05-22 RX ORDER — PREDNISOLONE ACETATE 10 MG/ML
1 SUSPENSION/ DROPS OPHTHALMIC 4 TIMES DAILY
COMMUNITY

## 2018-05-22 ASSESSMENT — VISUAL ACUITY
OS_CC: 20/40
OS_CC+: +2
METHOD: SNELLEN - LINEAR
OD_SC: 20/100
OD_PH_SC: 20/40
CORRECTION_TYPE: CONTACTS
OS_PH_CC: 20/25

## 2018-05-22 ASSESSMENT — TONOMETRY
OS_IOP_MMHG: CTL
OD_IOP_MMHG: 11
IOP_METHOD: ICARE

## 2018-05-22 ASSESSMENT — SLIT LAMP EXAM - LIDS
COMMENTS: NORMAL
COMMENTS: NORMAL

## 2018-05-22 ASSESSMENT — EXTERNAL EXAM - RIGHT EYE: OD_EXAM: NORMAL

## 2018-05-22 ASSESSMENT — EXTERNAL EXAM - LEFT EYE: OS_EXAM: NORMAL

## 2018-05-22 NOTE — MR AVS SNAPSHOT
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      Your next 10 appointments already scheduled     Joaquim 15, 2018  7:30 AM CDT   Post-Op with Robbie Garcia MD   Eye Clinic ([facility])    [address]  [facility] [id number] [phone]              Who to contact     Please call your clinic at [phone] to:    Ask questions about your health    Make or cancel appointments    Discuss your medicines    Learn about your test results    Speak to your doctor            Additional Information About Your Visit        MyChart Information     Motility Count gives you secure access to your electronic health record. If you see a primary care provider, you can also send messages to your care team and make appointments. If you have questions, please call your primary care clinic.  If you do not have a primary care provider, please call [phone] and they will assist you.      Mot[employer] Count is an electronic gateway that provides easy, online access to your medical records. With Mot[employer] Count, you can request a clinic appointment, read your test results, renew a prescription or communicate with your care team.     To access your existing account, please contact your [facility] or call [phone] for assistance.        Care EveryWhere ID     This is your Care EveryWhere ID. This could be used by other organizations to access your [place] medical records  [id number]         Blood Pressure from Last 3 Encounters:   04/23/18 101/68     Weight from Last 3 Encounters:   04/23/18 85.7 kg (189 lb)              Today, you had the following     No orders found for display       Primary Care Provider Fax #    Physician No Ref-Primary [phone]       No address on file        Equal Access to Services     ANAY ANDREA : Whitney Healy, wavickyda luqadaha, qaybta kaalmada francy, [place] lopezkendal edyta. So [facility] [phone].    ATENCIÓN: Si habla español, tiene a ferro disposición servicios gratuitos de asistencia lingüística. Llame al [phone].    We comply with applicable federal civil rights laws and [place] laws. We do not discriminate on the basis of race, color, national origin, age, disability, sex, sexual orientation, or gender identity.            Thank you!     Thank you for choosing EYE CLINIC  for your care. Our goal is always to provide you with excellent care. Hearing back from our patients is one way we can continue to improve our services. Please take a few minutes to complete the written survey that you may receive in the mail after your visit with us. Thank you!             Your Updated Medication List - Protect others around you: Learn how to safely use, store and throw away your medicines at www.disposemymeds.org.          This list is accurate as of 5/22/18 11:59 PM.  Always use your most recent med list.                   Brand Name Dispense Instructions for use Diagnosis    acetaZOLAMIDE 500 MG 12 hr capsule    DIAMOX SEQUEL    20 capsule    Take 1 capsule (500 mg) by mouth 2 times daily    H/O cornea transplant       atropine 1 % ophthalmic solution     1 Bottle    Place 1 drop into the right eye 2 times daily    H/O cornea transplant       colestipol 1 g tablet    COLESTID     Take 1 g by mouth 2 times daily Take 2 tablets        LOTEMAX 0.5 % ophthalmic susp   Generic drug:  loteprednol      Place 1 drop Into the left eye 2 times daily        * ofloxacin 0.3 % ophthalmic solution     OCUFLOX     Place 1 drop into the right eye 4 times daily        * ofloxacin 0.3 % ophthalmic solution    OCUFLOX    1 Bottle    Place 1 drop into both eyes 4 times daily    Lattice corneal dystrophy       ORTHO TRI-CYCLEN (28) PO      Take 1 tablet by mouth daily        * prednisoLONE acetate 1 % ophthalmic susp    PRED FORTE     Place 1 drop into the right eye 4 times daily        * prednisoLONE acetate 1 % ophthalmic susp    PRED FORTE    1 Bottle    Place 1 drop into both eyes 4 times daily    Lattice corneal dystrophy       sodium chloride 5 % ophthalmic solution    TRANG 128    1 Bottle    Place 1 drop into the right eye 4 times daily as needed    Cornea replaced by transplant, 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        * VALTREX PO      Take 1 capsule by mouth daily        * valACYclovir 500 MG tablet    VALTREX    14 tablet    Take 1 tablet (500 mg) by mouth daily    H/O cornea transplant       * Notice:  This list has 6 medication(s) that are the same as other medications prescribed for you. Read the directions carefully, and ask your doctor or other care provider to review them with you.

## 2018-05-22 NOTE — NURSING NOTE
Chief Complaints and History of Present Illnesses   Patient presents with     Post Op (Ophthalmology) Right Eye     s/p deep anterior lamellar keratoplasty (DALK) right eye (4/23/18)     HPI    Symptoms:           Do you have eye pain now?:  No      Comments:  POP right eye for s/p deep anterior lamellar keratoplasty (DALK) right eye (4/23/18).    The patient has no eye pain but she does notice pain/tenderness around the socket.  She states it is not horrible pain but she is aware of it.  Marleni Doan, ANDREY 8:19 AM 05/22/2018

## 2018-05-22 NOTE — LETTER
5/22/2018       RE: Leeanne Butcher  [address]  [place] [address]     Dear Dr. Renner,    I'm writing to give you an update on our mutual patient, Leeanne Butcher, who was seen today in the EYE CLINIC at [facility]. Please see a copy of my visit note below.    ~~~~~~~~~~~~~~~~~~~~~~~~~~~~~~~~~~~~~~~~~~~~~~~~~~~~~~~~~~~~~~~~~~~~~~    Assessment / Plan:    Leeanne Butcher is a 37 year old female who is     s/p deep anterior lamellar keratoplasty (DALK) right eye (4/23/18)  s/p rebubble in clinic (4/24/18): bubble was in interface  S/p rebubble with SF6 in OR (4/30/18): unsuccessful  S/p pentrating keratoplasty (PKP) 5/16/18    Doing well, vision improving,  PH 20/40 today    Medications in the surgical eye:      Continue prednisolone acetate 1% four times a day   DC Ofloxacin four times a day    ATs as needed        F/u 3 weeks, here or with Dr. Renner    Wear shield at night, limit activity, call will decreased vision, increased pain or redness.    Sonny Ortiz, DO  Cornea Fellow      ~~~~~~~~~~~~~~~~~~~~~~~~~~~~~~~~~~~~~~~~~~~~~~~~~~~~~~~~~~~~~~~~    As you can see, Leeanne had an interesting surgical course with her transplant.  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  After 3 weeks of being stuck in [place] and frustrated with her lack of improvement we decided together to switch gears and replace the graft with a penetrating keratoplasty (PK), which was performed on 5/16.  She is doing well at this point and expressed that it might be more convenient for her to do a 1-month check with you in [place].      I appreciate your willingness to supervise her postoperative care!  My typical regimen would include topical pred forte at  four times a day for the first 3 months, with a slow taper every 3 months or so.   I usually start suture removal at the 3-4 month danya according to topography.  Please feel free to get in touch with any questions or concerns you may have about her case.        Sincerely,        Robbie Garcia MD, MA  Director, Cornea & Anterior Segment  Director, Fellowship in Cornea & External Disease  [facility] Department of Ophthalmology & Visual Neuroscience  : Julianne Kwok [phone]  Email: [email or web address]  Mobile: [phone]

## 2018-05-22 NOTE — PROGRESS NOTES
Assessment / Plan:    Leeanne Butcher is a 37 year old female who is     s/p deep anterior lamellar keratoplasty (DALK) right eye (4/23/18)  s/p rebubble in clinic (4/24/18): bubble was in interface  S/p rebubble with SF6 in OR (4/30/18): unsuccessful  S/p pentrating keratoplasty (PKP) 5/16/18    Doing well, vision improving,  PH 20/40 today    Medications in the surgical eye:      Continue prednisolone acetate 1% four times a day   DC Ofloxacin four times a day    ATs as needed        F/u 3 weeks, here or with Dr. Renner    Wear shield at night, limit activity, call will decreased vision, increased pain or redness.    Sonny Ortiz, DO  Cornea Fellow      ~~~~~~~~~~~~~~~~~~~~~~~~~~~~~~~~~~~~~~~~~~~~~~~~~~~~~~~~~~~~~~~~    Complete documentation of historical and exam elements from today's encounter can be found in the full encounter summary report (not reduplicated in this progress note). I personally obtained the chief complaint(s) and history of present illness.  I confirmed and edited as necessary the review of systems, past medical/surgical history, family history, social history, and examination findings as documented by others.  I examined the patient myself, and I personally reviewed the relevant tests, images, and reports as documented above. I formulated and edited as necessary the assessment and plan and discussed the findings and management plan with the patient and family.     Robbie Garcia MD, MA  Director, Cornea & Anterior Segment  [facility] Department of Ophthalmology & Visual Neuroscience

## 2018-06-15 ENCOUNTER — TRANSFERRED RECORDS (OUTPATIENT)
Dept: HEALTH INFORMATION MANAGEMENT | Facility: CLINIC | Age: 38
End: 2018-06-15

## 2018-07-13 ENCOUNTER — MEDICAL CORRESPONDENCE (OUTPATIENT)
Dept: HEALTH INFORMATION MANAGEMENT | Facility: CLINIC | Age: 38
End: 2018-07-13

## 2020-02-24 ENCOUNTER — HEALTH MAINTENANCE LETTER (OUTPATIENT)
Age: 40
End: 2020-02-24

## 2020-12-13 ENCOUNTER — HEALTH MAINTENANCE LETTER (OUTPATIENT)
Age: 40
End: 2020-12-13

## 2021-04-17 ENCOUNTER — HEALTH MAINTENANCE LETTER (OUTPATIENT)
Age: 41
End: 2021-04-17

## 2021-09-26 ENCOUNTER — HEALTH MAINTENANCE LETTER (OUTPATIENT)
Age: 41
End: 2021-09-26

## 2022-05-08 ENCOUNTER — HEALTH MAINTENANCE LETTER (OUTPATIENT)
Age: 42
End: 2022-05-08

## 2023-01-14 ENCOUNTER — HEALTH MAINTENANCE LETTER (OUTPATIENT)
Age: 43
End: 2023-01-14

## 2023-06-02 ENCOUNTER — HEALTH MAINTENANCE LETTER (OUTPATIENT)
Age: 43
End: 2023-06-02

## 2024-02-11 ENCOUNTER — HEALTH MAINTENANCE LETTER (OUTPATIENT)
Age: 44
End: 2024-02-11

## (undated) DEVICE — ESU EYE HIGH TEMP 65410-183

## (undated) DEVICE — GLOVE PROTEXIS MICRO 6.5  2D73PM65

## (undated) DEVICE — NDL 18GA 1.5" 305196

## (undated) DEVICE — DRSG GAUZE 4X4" 3033

## (undated) DEVICE — NDL 27GA 0.5" 305109

## (undated) DEVICE — NDL 27GA 1.25" 305136

## (undated) DEVICE — SYR 03ML LL W/O NDL 309657

## (undated) DEVICE — NDL 30GA 0.5" 305106

## (undated) DEVICE — PEN MARKING SKIN FINE 31145942

## (undated) DEVICE — EYE SOL BSS 15ML BOTTLE 65079515

## (undated) DEVICE — EYE KNIFE STILETTO VISITEC 1.1MM ANG 45DEG SIDEPORT 376620

## (undated) DEVICE — EYE DRSG PAD OVAL

## (undated) DEVICE — EYE BLADE VACUUM RADIAL TREPHINE BARRON 8.0MM K20-2058

## (undated) DEVICE — EYE KNIFE CRESCENT 55DEG 373807

## (undated) DEVICE — LINEN TOWEL PACK X5 5464

## (undated) DEVICE — SU ETHILON 10-0 CS160-6 12" 9000G

## (undated) DEVICE — PACK CATARACT CUSTOM SO DALE SEY32CTFCX

## (undated) DEVICE — GOWN XLG DISP 9545

## (undated) DEVICE — GLOVE PROTEXIS MICRO 6.0  2D73PM60

## (undated) DEVICE — EYE CANN IRR 30GA  ANTERIOR CHAMBER 581273

## (undated) DEVICE — GLOVE PROTEXIS MICRO 7.5  2D73PM75

## (undated) DEVICE — EYE SPONGE SPEAR WECK CEL 0008685

## (undated) DEVICE — GOWN LG DISP 9515

## (undated) DEVICE — EYE PACK CUSTOM ANTERIOR 30DEG TIP CENTURION PPK6682-04

## (undated) DEVICE — SYR 03ML LL W/O NDL

## (undated) DEVICE — GLOVE PROTEXIS MICRO 8.0  2D73PM80

## (undated) DEVICE — DECANTER VIAL 2006S

## (undated) DEVICE — APPLICATOR COTTON TIP 6"X2 STERILE LF 6012

## (undated) DEVICE — SYR 05ML SLIP TIP W/O NDL

## (undated) DEVICE — DRAPE STERI APERATURE 51X33" 1030

## (undated) DEVICE — EYE FILTER MILLEX 0.22 MICRON

## (undated) DEVICE — TAPE MICROPORE 1"X1.5YD 1530S-1

## (undated) DEVICE — EYE PUNCH VACUUM BARRON 8.25MM K20-2109

## (undated) DEVICE — GLOVE PROTEXIS MICRO 7.0  2D73PM70

## (undated) DEVICE — Device

## (undated) RX ORDER — ERYTHROMYCIN 5 MG/G
OINTMENT OPHTHALMIC
Status: DISPENSED
Start: 2018-04-23

## (undated) RX ORDER — PROPOFOL 10 MG/ML
INJECTION, EMULSION INTRAVENOUS
Status: DISPENSED
Start: 2018-04-23

## (undated) RX ORDER — DEXAMETHASONE SODIUM PHOSPHATE 10 MG/ML
INJECTION, SOLUTION INTRAMUSCULAR; INTRAVENOUS
Status: DISPENSED
Start: 2018-04-23

## (undated) RX ORDER — FENTANYL CITRATE 50 UG/ML
INJECTION, SOLUTION INTRAMUSCULAR; INTRAVENOUS
Status: DISPENSED
Start: 2018-04-23

## (undated) RX ORDER — LIDOCAINE HYDROCHLORIDE 10 MG/ML
INJECTION, SOLUTION EPIDURAL; INFILTRATION; INTRACAUDAL; PERINEURAL
Status: DISPENSED
Start: 2018-04-23

## (undated) RX ORDER — BALANCED SALT SOLUTION 6.4; .75; .48; .3; 3.9; 1.7 MG/ML; MG/ML; MG/ML; MG/ML; MG/ML; MG/ML
SOLUTION OPHTHALMIC
Status: DISPENSED
Start: 2018-04-23

## (undated) RX ORDER — WATER 10 ML/10ML
INJECTION INTRAMUSCULAR; INTRAVENOUS; SUBCUTANEOUS
Status: DISPENSED
Start: 2018-04-23

## (undated) RX ORDER — TRIAMCINOLONE ACETONIDE 40 MG/ML
INJECTION, SUSPENSION INTRA-ARTICULAR; INTRAMUSCULAR
Status: DISPENSED
Start: 2018-04-23

## (undated) RX ORDER — PILOCARPINE HYDROCHLORIDE 10 MG/ML
SOLUTION/ DROPS OPHTHALMIC
Status: DISPENSED
Start: 2018-04-23

## (undated) RX ORDER — DEXAMETHASONE SODIUM PHOSPHATE 4 MG/ML
INJECTION, SOLUTION INTRA-ARTICULAR; INTRALESIONAL; INTRAMUSCULAR; INTRAVENOUS; SOFT TISSUE
Status: DISPENSED
Start: 2018-04-23

## (undated) RX ORDER — ONDANSETRON 2 MG/ML
INJECTION INTRAMUSCULAR; INTRAVENOUS
Status: DISPENSED
Start: 2018-04-23

## (undated) RX ORDER — LIDOCAINE HYDROCHLORIDE AND EPINEPHRINE 10; 10 MG/ML; UG/ML
INJECTION, SOLUTION INFILTRATION; PERINEURAL
Status: DISPENSED
Start: 2018-04-23

## (undated) RX ORDER — BUPIVACAINE HYDROCHLORIDE 5 MG/ML
INJECTION, SOLUTION EPIDURAL; INTRACAUDAL
Status: DISPENSED
Start: 2018-04-23

## (undated) RX ORDER — LIDOCAINE HYDROCHLORIDE 20 MG/ML
INJECTION, SOLUTION EPIDURAL; INFILTRATION; INTRACAUDAL; PERINEURAL
Status: DISPENSED
Start: 2018-04-23

## (undated) RX ORDER — TETRACAINE HYDROCHLORIDE 5 MG/ML
SOLUTION OPHTHALMIC
Status: DISPENSED
Start: 2018-04-23

## (undated) RX ORDER — CEFAZOLIN SODIUM 500 MG/2.2ML
INJECTION, POWDER, FOR SOLUTION INTRAMUSCULAR; INTRAVENOUS
Status: DISPENSED
Start: 2018-04-23